# Patient Record
Sex: FEMALE | Race: WHITE | NOT HISPANIC OR LATINO | Employment: OTHER | URBAN - METROPOLITAN AREA
[De-identification: names, ages, dates, MRNs, and addresses within clinical notes are randomized per-mention and may not be internally consistent; named-entity substitution may affect disease eponyms.]

---

## 2017-01-16 ENCOUNTER — PATIENT OUTREACH (OUTPATIENT)
Dept: ADMINISTRATIVE | Facility: HOSPITAL | Age: 67
End: 2017-01-16

## 2017-01-16 NOTE — LETTER
January 20, 2017    Zoraida Barth Matthew  [address] [facility] [address]             [facility]  [address]  [facility] [address]  Phone: [phone] Dear Mrs. Castro:    We have tried to reach you by mychart unsuccessfully.    [facility] is committed to your overall health.  To help you get the most out of each of your visits, we will review your information to make sure you are up to date on all of your recommended tests and/or procedures.       Dr. Mary Turner has found that you may be due for your cholesterol labs, osteoporosis screening, and possibly flu and pneumonia immunizations.     If you have had any of the above done at another facility, please bring the records or information with you so that your record at [facility] will be complete.     If you are currently taking medication, please bring it with you to your appointment for review.     Also, if you have any type of Advanced Directives, please bring them with you to your office visit so we may scan them into your chart.     If you have any questions or concerns, please don't hesitate to call.    Thank you for letting us care for you,  Christina Anderson LPN Clinical Care Coordinator  [facility]  [phone]

## 2017-01-30 ENCOUNTER — OFFICE VISIT (OUTPATIENT)
Dept: FAMILY MEDICINE | Facility: CLINIC | Age: 67
End: 2017-01-30
Payer: COMMERCIAL

## 2017-01-30 VITALS
DIASTOLIC BLOOD PRESSURE: 70 MMHG | TEMPERATURE: 98 F | HEIGHT: 63 IN | WEIGHT: 156.19 LBS | SYSTOLIC BLOOD PRESSURE: 128 MMHG | BODY MASS INDEX: 27.68 KG/M2 | HEART RATE: 68 BPM

## 2017-01-30 DIAGNOSIS — M85.80 AGE-RELATED BONE LOSS: ICD-10-CM

## 2017-01-30 DIAGNOSIS — Z79.899 HIGH RISK MEDICATIONS (NOT ANTICOAGULANTS) LONG-TERM USE: ICD-10-CM

## 2017-01-30 DIAGNOSIS — I10 ESSENTIAL HYPERTENSION: ICD-10-CM

## 2017-01-30 DIAGNOSIS — Z12.31 SCREENING MAMMOGRAM FOR HIGH-RISK PATIENT: ICD-10-CM

## 2017-01-30 DIAGNOSIS — N95.1 MENOPAUSAL SYMPTOMS: ICD-10-CM

## 2017-01-30 DIAGNOSIS — G56.03 BILATERAL CARPAL TUNNEL SYNDROME: ICD-10-CM

## 2017-01-30 DIAGNOSIS — Z00.00 ROUTINE GENERAL MEDICAL EXAMINATION AT A HEALTH CARE FACILITY: Primary | ICD-10-CM

## 2017-01-30 PROCEDURE — 99999 PR PBB SHADOW E&M-EST. PATIENT-LVL III: CPT | Mod: PBBFAC,,, | Performed by: FAMILY MEDICINE

## 2017-01-30 PROCEDURE — 99214 OFFICE O/P EST MOD 30 MIN: CPT | Mod: S$GLB,,, | Performed by: FAMILY MEDICINE

## 2017-01-30 PROCEDURE — 99213 OFFICE O/P EST LOW 20 MIN: CPT | Mod: PBBFAC,PO | Performed by: FAMILY MEDICINE

## 2017-01-30 RX ORDER — MEDROXYPROGESTERONE ACETATE 2.5 MG/1
TABLET ORAL
Qty: 30 TABLET | Refills: 5 | Status: SHIPPED | OUTPATIENT
Start: 2017-01-30 | End: 2017-10-26 | Stop reason: SDUPTHER

## 2017-01-30 RX ORDER — ESTRADIOL 0.5 MG/1
0.5 TABLET ORAL DAILY
Qty: 30 TABLET | Refills: 5 | Status: SHIPPED | OUTPATIENT
Start: 2017-01-30 | End: 2017-10-26 | Stop reason: SDUPTHER

## 2017-01-30 RX ORDER — LISINOPRIL AND HYDROCHLOROTHIAZIDE 12.5; 2 MG/1; MG/1
TABLET ORAL
Qty: 90 TABLET | Refills: 1 | Status: SHIPPED | OUTPATIENT
Start: 2017-01-30 | End: 2017-07-21 | Stop reason: SDUPTHER

## 2017-01-30 NOTE — MR AVS SNAPSHOT
[facility]  [address]  [place] [address]  Phone: [phone]  Fax: [phone]                  Zoraida Castro   2017 8:40 AM   Office Visit    Description:  Female : 1950   Provider:  Mary Turner MD   Department:  [facility]           Reason for Visit     Annual Exam           Diagnoses this Visit        Comments    Routine general medical examination at a health care facility    -  Primary     Essential hypertension         Menopausal symptoms         Age-related bone loss         Screening mammogram for high-risk patient         High risk medications (not anticoagulants) long-term use         Bilateral carpal tunnel syndrome                To Do List           Future Appointments        Provider Department Dept Phone    2017 8:15 AM [facility] MAMMO1 [facility] [phone]    2017 8:30 AM LAB, [place] [facility] [phone]    3/3/2017 7:45 AM EKG, [facility] - Cardiology [phone]      Goals (5 Years of Data)     None      Follow-Up and Disposition     Return in about 1 year (around 2018) for recheck.       These Medications        Disp Refills Start End    lisinopril-hydrochlorothiazide (PRINZIDE,ZESTORETIC) 20-12.5 mg per tablet 90 tablet 1 2017     TAKE 2 TABLETS BY MOUTH EVERY MORNING.    Ph[facility]acy: [id number] [place]  [address] [facility] Ph #: [phone]       medroxyPROGESTERone (PROVERA) 2.5 MG tablet 30 tablet 5 2017     TAKE 1 TABLET (2.5 MG TOTAL)  BY MOUTH ONCE DAILY.    Ph[facility]acy: [id number] - [place] - [address] [facility] Ph #: [phone]       estradiol (ESTRACE) 0.5 MG tablet 30 tablet 5 2017     Take 1 tablet (0.5 mg total) by mouth once daily. - Oral    Pharmacy: [id number] - [place] - [address] [facility] Ph #: [phone]         [facility]     [facility] Nurse Care Line -  "24/7 Assistance  Registered nurses in the [facility] provide clinical advisement, health education, appointment booking, and other advisory services.  Call for this free service at [phone].             Medications           Message regarding Medications     Verify the changes and/or additions to your medication regime listed below are the same as discussed with your clinician today.  If any of these changes or additions are incorrect, please notify your healthcare provider.             Verify that the below list of medications is an accurate representation of the medications you are currently taking.  If none reported, the list may be blank. If incorrect, please contact your healthcare provider. Carry this list with you in case of emergency.           Current Medications     calcium carb&cit-mag12-vit D3 (CALCIUM MAGNESIUM + D) 500-250-200 mg-mg-unit Tab Take by mouth. 1.5 Tablet Oral Twice a day     lisinopril-hydrochlorothiazide (PRINZIDE,ZESTORETIC) 20-12.5 mg per tablet TAKE 2 TABLETS BY MOUTH EVERY MORNING.    medroxyPROGESTERone (PROVERA) 2.5 MG tablet TAKE 1 TABLET (2.5 MG TOTAL)  BY MOUTH ONCE DAILY.    multivitamin with minerals tablet Take 1 tablet by mouth once daily.    estradiol (ESTRACE) 0.5 MG tablet Take 1 tablet (0.5 mg total) by mouth once daily.    scopolamine (TRANSDERM-SCOP) 1.5 mg (1 mg over 3 days) Place 1 patch (1.5 mg total) onto the skin every 72 hours.           Clinical Reference Information           Vital Signs - Last Recorded  Most recent update: 1/30/2017  8:53 AM by Eliana Griffin LPN    BP Pulse Temp Ht Wt BMI    128/70 68 98.2 °F (36.8 °C) 5' 3" (1.6 m) 70.8 kg (156 lb 3.1 oz) 27.67 kg/m2      Blood Pressure          Most Recent Value    BP  128/70      Allergies as of 1/30/2017     Penicillins      Immunizations Administered on Date of Encounter - 1/30/2017     None      Orders Placed During Today's Visit      Normal Orders This Visit    Ambulatory referral to " Orthopedics     IN OFFICE EKG 12-LEAD (to [place])     Future Labs/Procedures Expected by Expires    CBC auto differential  1/30/2017 3/31/2018    Comprehensive metabolic panel  1/30/2017 3/31/2018    DXA Bone Density Spine And Hip  1/30/2017 1/30/2018    Lipid panel  1/30/2017 3/31/2018    Mammo Digital Screening Bilat with CAD  1/30/2017 3/31/2018    TSH  1/30/2017 3/31/2018

## 2017-01-30 NOTE — PROGRESS NOTES
Subjective:       Patient ID: Zoraida Castro is a 67 y.o. female.    Chief Complaint: Annual Exam    HPI   Patient here today for her annual exam.  Still having 4+ hot flashes daily since trying to stop the estrogen supplementation.   Ongoing issues with her hands - some numbness. Spends a lot of time on the computer.  Her dad passed away last fall - doing ok.     Review of Systems   Constitutional: Positive for unexpected weight change (weight gain while her dad was sick last year). Negative for activity change, chills, fatigue and fever.   HENT: Negative for congestion, hearing loss, postnasal drip, rhinorrhea, sinus pressure, sneezing and sore throat.    Eyes: Negative for discharge and redness.   Respiratory: Negative for apnea (none reported), cough (am cough, chronic), shortness of breath and wheezing.    Cardiovascular: Negative for chest pain, palpitations and leg swelling (some fluid retention in the ankles).   Gastrointestinal: Negative for abdominal pain, blood in stool, constipation, diarrhea, nausea and vomiting.        Gerd trigger: bread.   Genitourinary: Negative for difficulty urinating, dysuria, vaginal bleeding and vaginal discharge.   Musculoskeletal: Negative for arthralgias, back pain, gait problem, joint swelling and myalgias.   Skin: Negative for color change and rash.   Neurological: Positive for numbness (L hand occ goes numb while driving, she uses a pillow to rest the arm). Negative for weakness and headaches.   Psychiatric/Behavioral: Negative for dysphoric mood (has used lexapro in the past) and sleep disturbance (doing better of late). The patient is not nervous/anxious.        Objective:      Physical Exam   Constitutional: She is oriented to person, place, and time. She appears well-developed and well-nourished. No distress.   HENT:   Head: Normocephalic and atraumatic.   Right Ear: External ear normal.   Left Ear: External ear normal.   Nose: Nose normal.   Mouth/Throat:  Oropharynx is clear and moist. No oropharyngeal exudate.   Eyes: Conjunctivae and EOM are normal. Pupils are equal, round, and reactive to light.   Neck: Normal range of motion. Neck supple. No thyromegaly present.   Cardiovascular: Normal rate and regular rhythm.    Pulmonary/Chest: Effort normal and breath sounds normal. No respiratory distress. She has no wheezes.   Abdominal: Soft. Bowel sounds are normal. She exhibits no distension and no mass. There is no tenderness. There is no rebound and no guarding.   Musculoskeletal: Normal range of motion. She exhibits no edema.   Lymphadenopathy:     She has no cervical adenopathy.   Neurological: She is alert and oriented to person, place, and time. She has normal reflexes. No cranial nerve deficit.   Skin: Skin is warm and dry.   Psychiatric: She has a normal mood and affect. Her behavior is normal.   Nursing note and vitals reviewed.        Routine general medical examination at a health care facility  -     IN OFFICE EKG 12-LEAD (to [place])  Anticipatory guidance reviewed.  Essential hypertension  -     lisinopril-hydrochlorothiazide (PRINZIDE,ZESTORETIC) 20-12.5 mg per tablet; TAKE 2 TABLETS BY MOUTH EVERY MORNING.  Dispense: 90 tablet; Refill: 1  -     Comprehensive metabolic panel; Future; Expected date: 1/30/17  -     Lipid panel; Future; Expected date: 1/30/17  -     IN OFFICE EKG 12-LEAD (to [place])  Controlled. Currently on 1 tablet daily. Doing well overall.  Menopausal symptoms  -     medroxyPROGESTERone (PROVERA) 2.5 MG tablet; TAKE 1 TABLET (2.5 MG TOTAL)  BY MOUTH ONCE DAILY.  Dispense: 30 tablet; Refill: 5  -     estradiol (ESTRACE) 0.5 MG tablet; Take 1 tablet (0.5 mg total) by mouth once daily.  Dispense: 30 tablet; Refill: 5  -     TSH; Future; Expected date: 1/30/17  Ok to restart estrogen prn for sx control. Pt aware that it may not be covered under ins.  Age-related bone loss  -     DXA Bone Density Spine And Hip; Future; Expected date: 1/30/17  Ca+d,  weight-bearing exercise.  Screening mammogram for high-risk patient  -     Mammo Digital Screening Bilat with CAD; Future; Expected date: 1/30/17  High risk medications (not anticoagulants) long-term use  -     DXA Bone Density Spine And Hip; Future; Expected date: 1/30/17  -     CBC auto differential; Future; Expected date: 1/30/17  -     TSH; Future; Expected date: 1/30/17  Bilateral carpal tunnel syndrome  -     Ambulatory referral to Orthopedics  Mechanics review for modification. F/u with ortho.

## 2017-02-04 ENCOUNTER — HOSPITAL ENCOUNTER (OUTPATIENT)
Dept: RADIOLOGY | Facility: HOSPITAL | Age: 67
Discharge: HOME | End: 2017-02-04
Attending: FAMILY MEDICINE
Payer: MEDICARE

## 2017-02-04 DIAGNOSIS — Z12.31 SCREENING MAMMOGRAM FOR HIGH-RISK PATIENT: ICD-10-CM

## 2017-02-04 PROCEDURE — 77067 SCR MAMMO BI INCL CAD: CPT | Mod: TC

## 2017-02-04 PROCEDURE — 77067 SCR MAMMO BI INCL CAD: CPT | Mod: 26,,, | Performed by: RADIOLOGY

## 2017-02-04 PROCEDURE — 77063 BREAST TOMOSYNTHESIS BI: CPT | Mod: 26,,, | Performed by: RADIOLOGY

## 2017-02-07 ENCOUNTER — TELEPHONE (OUTPATIENT)
Dept: RADIOLOGY | Facility: HOSPITAL | Age: 67
End: 2017-02-07

## 2017-02-20 ENCOUNTER — HOSPITAL ENCOUNTER (OUTPATIENT)
Dept: RADIOLOGY | Facility: HOSPITAL | Age: 67
Discharge: HOME | End: 2017-02-20
Attending: FAMILY MEDICINE
Payer: MEDICARE

## 2017-02-20 DIAGNOSIS — R92.8 ABNORMAL MAMMOGRAM OF RIGHT BREAST: ICD-10-CM

## 2017-02-20 PROCEDURE — 77065 DX MAMMO INCL CAD UNI: CPT | Mod: TC

## 2017-02-20 PROCEDURE — 77061 BREAST TOMOSYNTHESIS UNI: CPT | Mod: 26,,, | Performed by: RADIOLOGY

## 2017-02-20 PROCEDURE — 77061 BREAST TOMOSYNTHESIS UNI: CPT | Mod: TC

## 2017-02-20 PROCEDURE — 77065 DX MAMMO INCL CAD UNI: CPT | Mod: 26,,, | Performed by: RADIOLOGY

## 2017-03-03 ENCOUNTER — APPOINTMENT (OUTPATIENT)
Dept: CARDIOLOGY | Facility: CLINIC | Age: 67
End: 2017-03-03
Payer: MEDICARE

## 2017-03-03 PROCEDURE — 93005 ELECTROCARDIOGRAM TRACING: CPT | Mod: PBBFAC,PO | Performed by: INTERNAL MEDICINE

## 2017-03-03 PROCEDURE — 93000 ELECTROCARDIOGRAM COMPLETE: CPT | Mod: S$GLB,,, | Performed by: INTERNAL MEDICINE

## 2017-05-15 ENCOUNTER — TELEPHONE (OUTPATIENT)
Dept: FAMILY MEDICINE | Facility: CLINIC | Age: 67
End: 2017-05-15

## 2017-05-15 RX ORDER — SCOLOPAMINE TRANSDERMAL SYSTEM 1 MG/1
1 PATCH, EXTENDED RELEASE TRANSDERMAL
Qty: 4 PATCH | Refills: 0 | Status: SHIPPED | OUTPATIENT
Start: 2017-05-15 | End: 2018-02-09 | Stop reason: SDUPTHER

## 2017-05-15 NOTE — TELEPHONE ENCOUNTER
----- Message from Brandee Jones sent at 5/15/2017 11:06 AM CDT -----  Patient is requesting a prescription for a motion prescription patch due [place] ferry [place], contact patient at [phone].        [id number] [place] - [address] [facility]  [address] [facility]  [place] [address]  Phone: [phone] Fax: [phone]

## 2017-07-21 DIAGNOSIS — I10 ESSENTIAL HYPERTENSION: ICD-10-CM

## 2017-07-21 RX ORDER — LISINOPRIL AND HYDROCHLOROTHIAZIDE 12.5; 2 MG/1; MG/1
TABLET ORAL
Qty: 90 TABLET | Refills: 1 | Status: SHIPPED | OUTPATIENT
Start: 2017-07-21 | End: 2019-03-01 | Stop reason: SDUPTHER

## 2017-09-20 ENCOUNTER — TELEPHONE (OUTPATIENT)
Dept: AUDIOLOGY | Facility: CLINIC | Age: 67
End: 2017-09-20

## 2017-09-20 NOTE — TELEPHONE ENCOUNTER
LMOV for pt to call [facility] back regarding hearing aid warranty. Stated that pt's warranty will not be renewed unless she contacts the hearing aid dept. To let us know that she does want to renew the warranty for an additional year.

## 2017-10-26 DIAGNOSIS — N95.1 MENOPAUSAL SYMPTOMS: ICD-10-CM

## 2017-10-26 RX ORDER — ESTRADIOL 0.5 MG/1
0.5 TABLET ORAL DAILY
Qty: 30 TABLET | Refills: 4 | Status: SHIPPED | OUTPATIENT
Start: 2017-10-26 | End: 2018-09-20 | Stop reason: SDUPTHER

## 2017-10-26 RX ORDER — MEDROXYPROGESTERONE ACETATE 2.5 MG/1
TABLET ORAL
Qty: 30 TABLET | Refills: 4 | Status: SHIPPED | OUTPATIENT
Start: 2017-10-26 | End: 2018-09-20 | Stop reason: SDUPTHER

## 2018-01-26 ENCOUNTER — PATIENT OUTREACH (OUTPATIENT)
Dept: ADMINISTRATIVE | Facility: HOSPITAL | Age: 68
End: 2018-01-26

## 2018-01-26 NOTE — LETTER
February 1, 2018    Zoraida Castro  [address] [facility] [address]             [facility]  [address]  [facility] [address]  Phone: [phone] Dear Mrs. Castro:    We have tried to reach you by mychart unsuccessfully.    [facility] committed to your overall health.  To help you get the most out of each of your visits, we will review your information to make sure you are up to date on all of your recommended tests and/or procedures.       Dr. Mary Turner has found that your chart shows you may be due for osteoporosis screening and possibly pneumonia and flu immunizations.     If you have had any of the above done at another facility, please bring the records or information with you so that your record at [facility] will be complete.  If you would like to schedule any of these, please contact me.     If you are currently taking medication, please bring it with you to your appointment for review.     If you have any questions or concerns, please don't hesitate to call.    Thank you for letting us care for you,  Christina Anderson LPN Clinical Care Coordinator  [facility]  [phone]

## 2018-02-09 ENCOUNTER — OFFICE VISIT (OUTPATIENT)
Dept: FAMILY MEDICINE | Facility: CLINIC | Age: 68
End: 2018-02-09
Payer: MEDICARE

## 2018-02-09 VITALS
HEIGHT: 63 IN | BODY MASS INDEX: 27.09 KG/M2 | OXYGEN SATURATION: 97 % | HEART RATE: 73 BPM | SYSTOLIC BLOOD PRESSURE: 132 MMHG | WEIGHT: 152.88 LBS | RESPIRATION RATE: 16 BRPM | TEMPERATURE: 99 F | DIASTOLIC BLOOD PRESSURE: 82 MMHG

## 2018-02-09 DIAGNOSIS — Z78.0 POSTMENOPAUSAL STATE: ICD-10-CM

## 2018-02-09 DIAGNOSIS — Z23 IMMUNIZATION DUE: ICD-10-CM

## 2018-02-09 DIAGNOSIS — N95.1 MENOPAUSAL SYMPTOMS: ICD-10-CM

## 2018-02-09 DIAGNOSIS — I10 HYPERTENSION, UNSPECIFIED TYPE: Primary | ICD-10-CM

## 2018-02-09 DIAGNOSIS — F32.5 MAJOR DEPRESSIVE DISORDER WITH SINGLE EPISODE, IN FULL REMISSION: ICD-10-CM

## 2018-02-09 PROCEDURE — 99214 OFFICE O/P EST MOD 30 MIN: CPT | Mod: PBBFAC,PN | Performed by: FAMILY MEDICINE

## 2018-02-09 PROCEDURE — 99214 OFFICE O/P EST MOD 30 MIN: CPT | Mod: S$PBB,,, | Performed by: FAMILY MEDICINE

## 2018-02-09 PROCEDURE — 99999 PR PBB SHADOW E&M-EST. PATIENT-LVL IV: CPT | Mod: PBBFAC,,, | Performed by: FAMILY MEDICINE

## 2018-02-09 PROCEDURE — G0009 ADMIN PNEUMOCOCCAL VACCINE: HCPCS | Mod: PBBFAC,PN

## 2018-02-09 PROCEDURE — 1159F MED LIST DOCD IN RCRD: CPT | Mod: ,,, | Performed by: FAMILY MEDICINE

## 2018-02-09 PROCEDURE — 1126F AMNT PAIN NOTED NONE PRSNT: CPT | Mod: ,,, | Performed by: FAMILY MEDICINE

## 2018-02-09 RX ORDER — ASPIRIN 81 MG/1
81 TABLET ORAL EVERY OTHER DAY
COMMUNITY

## 2018-02-09 RX ORDER — SCOLOPAMINE TRANSDERMAL SYSTEM 1 MG/1
1 PATCH, EXTENDED RELEASE TRANSDERMAL
Qty: 4 PATCH | Refills: 0 | Status: SHIPPED | OUTPATIENT
Start: 2018-02-09 | End: 2019-02-15 | Stop reason: SDUPTHER

## 2018-02-09 NOTE — PROGRESS NOTES
Subjective:       Patient ID: Zoraida Castro is a 68 y.o. female.    Chief Complaint: Follow-up (check up)    HPI   Patient in the office for f/u and review.  PMH sig for HTN, mdd.  Enjoying assisted, staying busy. Works in the yard a lot.  The 10-year ASCVD risk score (Mary BEDOLLA Jr., et al., 2013) is: 12.2%. (2017).   Recommended she start low-dose asa daily or qod as she reports concern for bruising prev.  Menopausal hot flashes continue if not on HRT. Discussed risks ass'd with continued hrt use, pt expressed understanding.    Review of Systems   Constitutional: Negative for activity change, fatigue and unexpected weight change.   HENT: Negative for congestion, hearing loss, rhinorrhea, sneezing, sore throat and trouble swallowing.    Eyes: Negative for discharge and visual disturbance.   Respiratory: Negative for cough (morning production, clear; resolves by mid-day), chest tightness, shortness of breath and wheezing.    Cardiovascular: Negative for chest pain, palpitations and leg swelling.   Gastrointestinal: Negative for blood in stool, constipation, diarrhea and vomiting.        Occ gerd with trigger foods.   Endocrine: Negative for polydipsia and polyuria.   Genitourinary: Negative for difficulty urinating, dysuria, hematuria and menstrual problem.   Musculoskeletal: Negative for arthralgias, joint swelling and neck pain.        No falls since LOV.   Neurological: Negative for dizziness, weakness, light-headedness and headaches.   Psychiatric/Behavioral: Negative for confusion, dysphoric mood and sleep disturbance.       Objective:      Physical Exam   Constitutional: She is oriented to person, place, and time. She appears well-developed and well-nourished. No distress.   HENT:   Head: Normocephalic and atraumatic.   Right Ear: External ear normal.   Left Ear: External ear normal.   Nose: Nose normal.   Mouth/Throat: Oropharynx is clear and moist. No oropharyngeal exudate.   Eyes: Conjunctivae and EOM  are normal. Pupils are equal, round, and reactive to light.   Neck: Normal range of motion. Neck supple. No thyromegaly present.   Cardiovascular: Normal rate and regular rhythm.    Pulmonary/Chest: Effort normal and breath sounds normal. No respiratory distress. She has no wheezes.   Abdominal: Soft. Bowel sounds are normal. She exhibits no distension and no mass. There is no tenderness. There is no rebound and no guarding.   Musculoskeletal: Normal range of motion. She exhibits no edema.   Lymphadenopathy:     She has no cervical adenopathy.   Neurological: She is alert and oriented to person, place, and time. She has normal reflexes. No cranial nerve deficit.   Skin: Skin is warm and dry.   Psychiatric: She has a normal mood and affect. Her behavior is normal.   Nursing note and vitals reviewed.        Hypertension, unspecified type  -     Comprehensive metabolic panel; Future; Expected date: 02/09/2018  -     TSH; Future; Expected date: 02/09/2018  -     Lipid panel; Future; Expected date: 02/09/2018  Controlled, cont regimen.  Immunization due  -     (In Office Administered) Pneumococcal Conjugate Vaccine (13 Valent) (IM)  Menopausal symptoms  Comments:  decrease dose of HRT by half, if symptoms are manageable, can trial discontinue  Major depressive disorder with single episode, in full remission  Comments:  sx resolved  Postmenopausal state  -     DXA Bone Density Spine And Hip; Future; Expected date: 02/09/2018  Ca+d, weight-bearing exercise.  Other orders  -     scopolamine (TRANSDERM-SCOP) 1.3-1.5 mg (1 mg over 3 days); Place 1 patch onto the skin every 72 hours.  Dispense: 4 patch; Refill: 0 Side effects and precautions of medication use reviewed with patient, expressed understanding. No questions or concerns.

## 2018-02-26 ENCOUNTER — HOSPITAL ENCOUNTER (OUTPATIENT)
Dept: RADIOLOGY | Facility: HOSPITAL | Age: 68
Discharge: HOME | End: 2018-02-26
Attending: FAMILY MEDICINE
Payer: MEDICARE

## 2018-02-26 DIAGNOSIS — Z78.0 POSTMENOPAUSAL STATE: ICD-10-CM

## 2018-02-26 DIAGNOSIS — Z12.31 VISIT FOR SCREENING MAMMOGRAM: ICD-10-CM

## 2018-02-26 PROCEDURE — 77080 DXA BONE DENSITY AXIAL: CPT | Mod: 26,,, | Performed by: RADIOLOGY

## 2018-02-26 PROCEDURE — 77063 BREAST TOMOSYNTHESIS BI: CPT | Mod: 26,,, | Performed by: RADIOLOGY

## 2018-02-26 PROCEDURE — 77067 SCR MAMMO BI INCL CAD: CPT | Mod: 26,,, | Performed by: RADIOLOGY

## 2018-02-26 PROCEDURE — 77080 DXA BONE DENSITY AXIAL: CPT | Mod: TC,PO

## 2018-02-26 PROCEDURE — 77067 SCR MAMMO BI INCL CAD: CPT | Mod: TC,PO

## 2018-03-03 ENCOUNTER — PATIENT MESSAGE (OUTPATIENT)
Dept: FAMILY MEDICINE | Facility: CLINIC | Age: 68
End: 2018-03-03

## 2018-04-16 ENCOUNTER — LAB VISIT (OUTPATIENT)
Dept: LAB | Facility: HOSPITAL | Age: 68
End: 2018-04-16
Attending: FAMILY MEDICINE
Payer: MEDICARE

## 2018-04-16 DIAGNOSIS — I10 HYPERTENSION, UNSPECIFIED TYPE: ICD-10-CM

## 2018-04-16 LAB
ALBUMIN SERPL BCP-MCNC: 3.9 G/DL
ALP SERPL-CCNC: 55 U/L
ALT SERPL W/O P-5'-P-CCNC: 15 U/L
ANION GAP SERPL CALC-SCNC: 8 MMOL/L
AST SERPL-CCNC: 22 U/L
BILIRUB SERPL-MCNC: 0.4 MG/DL
BUN SERPL-MCNC: 16 MG/DL
CALCIUM SERPL-MCNC: 9.7 MG/DL
CHLORIDE SERPL-SCNC: 103 MMOL/L
CHOLEST SERPL-MCNC: 234 MG/DL
CHOLEST/HDLC SERPL: 3 {RATIO}
CO2 SERPL-SCNC: 29 MMOL/L
CREAT SERPL-MCNC: 0.9 MG/DL
EST. GFR  (AFRICAN AMERICAN): >60 ML/MIN/1.73 M^2
EST. GFR  (NON AFRICAN AMERICAN): >60 ML/MIN/1.73 M^2
GLUCOSE SERPL-MCNC: 98 MG/DL
HDLC SERPL-MCNC: 77 MG/DL
HDLC SERPL: 32.9 %
LDLC SERPL CALC-MCNC: 143.4 MG/DL
NONHDLC SERPL-MCNC: 157 MG/DL
POTASSIUM SERPL-SCNC: 3.8 MMOL/L
PROT SERPL-MCNC: 7 G/DL
SODIUM SERPL-SCNC: 140 MMOL/L
TRIGL SERPL-MCNC: 68 MG/DL
TSH SERPL DL<=0.005 MIU/L-ACNC: 1.4 UIU/ML

## 2018-04-16 PROCEDURE — 80053 COMPREHEN METABOLIC PANEL: CPT

## 2018-04-16 PROCEDURE — 80061 LIPID PANEL: CPT

## 2018-04-16 PROCEDURE — 84443 ASSAY THYROID STIM HORMONE: CPT

## 2018-04-16 PROCEDURE — 36415 COLL VENOUS BLD VENIPUNCTURE: CPT | Mod: PN

## 2018-08-06 RX ORDER — LISINOPRIL AND HYDROCHLOROTHIAZIDE 12.5; 2 MG/1; MG/1
TABLET ORAL
Qty: 90 TABLET | Refills: 1 | Status: SHIPPED | OUTPATIENT
Start: 2018-08-06 | End: 2019-03-01 | Stop reason: SDUPTHER

## 2018-09-20 DIAGNOSIS — N95.1 MENOPAUSAL SYMPTOMS: ICD-10-CM

## 2018-09-20 RX ORDER — MEDROXYPROGESTERONE ACETATE 2.5 MG/1
TABLET ORAL
Qty: 30 TABLET | Refills: 4 | Status: SHIPPED | OUTPATIENT
Start: 2018-09-20 | End: 2019-07-24 | Stop reason: SDUPTHER

## 2018-09-20 RX ORDER — ESTRADIOL 0.5 MG/1
TABLET ORAL
Qty: 30 TABLET | Refills: 4 | Status: SHIPPED | OUTPATIENT
Start: 2018-09-20 | End: 2019-07-24 | Stop reason: SDUPTHER

## 2019-02-15 ENCOUNTER — PATIENT MESSAGE (OUTPATIENT)
Dept: FAMILY MEDICINE | Facility: CLINIC | Age: 69
End: 2019-02-15

## 2019-02-15 RX ORDER — SCOLOPAMINE TRANSDERMAL SYSTEM 1 MG/1
1 PATCH, EXTENDED RELEASE TRANSDERMAL
Qty: 10 PATCH | Refills: 1 | Status: SHIPPED | OUTPATIENT
Start: 2019-02-15 | End: 2019-02-21 | Stop reason: SDUPTHER

## 2019-02-19 ENCOUNTER — PATIENT MESSAGE (OUTPATIENT)
Dept: FAMILY MEDICINE | Facility: CLINIC | Age: 69
End: 2019-02-19

## 2019-02-20 ENCOUNTER — PATIENT MESSAGE (OUTPATIENT)
Dept: FAMILY MEDICINE | Facility: CLINIC | Age: 69
End: 2019-02-20

## 2019-02-21 NOTE — TELEPHONE ENCOUNTER
Please see pending rx request for Scopolamine.   Pt sent message to pt advise and states that [facility] has the Scopolamine patch and is requesting two boxes. I contacted [facility] in [place] [address] [address] and they said that they do not have this medication or a current rx for this pt.                  [address] [place], [place] [address]                  [phone]     Last ov 2/9/18

## 2019-02-21 NOTE — TELEPHONE ENCOUNTER
Refill Authorization Note     is requesting a refill authorization.    Brief assessment and rationale for refill: APPROVE: prr  Name and strength of medication: scopolamine (TRANSDERM-SCOP) 1.3-1.5 mg (1 mg over 3 days)       Medication Therapy Plan: last escripted to [facility]. now pt requesting script to be sent to [facility]; Approve 60 day supply as prescribed     Medication reconciliation completed: No              How patient will take medication: utd          Comments:   APPOINTMENTS (past 12 m or future 3m authorizing provider)  LAST VISIT DATE  Mary Turner MD 2/9/2018         NEXT VISIT DATE  Mary Turner MD 5/6/2019

## 2019-02-22 RX ORDER — SCOLOPAMINE TRANSDERMAL SYSTEM 1 MG/1
1 PATCH, EXTENDED RELEASE TRANSDERMAL
Qty: 10 PATCH | Refills: 1 | Status: SHIPPED | OUTPATIENT
Start: 2019-02-22 | End: 2020-03-12 | Stop reason: SDUPTHER

## 2019-02-28 DIAGNOSIS — I10 HYPERTENSION, UNSPECIFIED TYPE: Primary | ICD-10-CM

## 2019-03-01 RX ORDER — LISINOPRIL AND HYDROCHLOROTHIAZIDE 12.5; 2 MG/1; MG/1
TABLET ORAL
Qty: 90 TABLET | Refills: 0 | Status: SHIPPED | OUTPATIENT
Start: 2019-03-01 | End: 2019-07-24 | Stop reason: SDUPTHER

## 2019-03-01 NOTE — PROGRESS NOTES
Refill Authorization Note     is requesting a refill authorization.    Brief assessment and rationale for refill: APPROVE; Needs labs  Name and strength of medication: LISINOPRIL-HYDROCHLOROTHIAZIDE 20-12.5 MG TABLET  Medication-related problems identified: Requires labs    Medication Therapy Plan: HTN -controlled lco (lov 2/18); Needs labs, NTBO (BMP); FOV annual scheduled; approve 3 more    Medication reconciliation completed: No              How patient will take medication: 1 po daily           Comments:   Blood Pressure Readings: <139/89mmHg (12 months)  BP Readings from Last 3 Encounters:   02/09/18 132/82   01/30/17 128/70   02/03/16 136/79        Lab Results   Component Value Date    CREATININE 0.9 04/16/2018    BUN 16 04/16/2018     04/16/2018    K 3.8 04/16/2018     04/16/2018    CO2 29 04/16/2018       APPOINTMENTS (past 12 m or future 3m authorizing provider)  LAST VISIT DATE  Mary Turner MD 2/9/2018         NEXT VISIT DATE  Mary Turner MD 5/6/2019

## 2019-03-01 NOTE — TELEPHONE ENCOUNTER
Tried to reach pt. No answer, left msg to call clinic back in regards to scheduling her for labs. Clinic phone number provided.

## 2019-03-04 ENCOUNTER — PATIENT MESSAGE (OUTPATIENT)
Dept: FAMILY MEDICINE | Facility: CLINIC | Age: 69
End: 2019-03-04

## 2019-03-19 ENCOUNTER — PATIENT MESSAGE (OUTPATIENT)
Dept: FAMILY MEDICINE | Facility: CLINIC | Age: 69
End: 2019-03-19

## 2019-03-19 DIAGNOSIS — Z12.31 ENCOUNTER FOR SCREENING MAMMOGRAM FOR BREAST CANCER: Primary | ICD-10-CM

## 2019-05-01 ENCOUNTER — LAB VISIT (OUTPATIENT)
Dept: LAB | Facility: HOSPITAL | Age: 69
End: 2019-05-01
Attending: FAMILY MEDICINE
Payer: MEDICARE

## 2019-05-01 DIAGNOSIS — I10 HYPERTENSION, UNSPECIFIED TYPE: ICD-10-CM

## 2019-05-01 LAB
ANION GAP SERPL CALC-SCNC: 10 MMOL/L (ref 8–16)
BUN SERPL-MCNC: 12 MG/DL (ref 8–23)
CALCIUM SERPL-MCNC: 9.6 MG/DL (ref 8.7–10.5)
CHLORIDE SERPL-SCNC: 105 MMOL/L (ref 95–110)
CO2 SERPL-SCNC: 25 MMOL/L (ref 23–29)
CREAT SERPL-MCNC: 0.8 MG/DL (ref 0.5–1.4)
EST. GFR  (AFRICAN AMERICAN): >60 ML/MIN/1.73 M^2
EST. GFR  (NON AFRICAN AMERICAN): >60 ML/MIN/1.73 M^2
GLUCOSE SERPL-MCNC: 90 MG/DL (ref 70–110)
POTASSIUM SERPL-SCNC: 4.2 MMOL/L (ref 3.5–5.1)
SODIUM SERPL-SCNC: 140 MMOL/L (ref 136–145)

## 2019-05-01 PROCEDURE — 36415 COLL VENOUS BLD VENIPUNCTURE: CPT | Mod: PN

## 2019-05-01 PROCEDURE — 80048 BASIC METABOLIC PNL TOTAL CA: CPT

## 2019-05-02 ENCOUNTER — HOSPITAL ENCOUNTER (OUTPATIENT)
Dept: RADIOLOGY | Facility: HOSPITAL | Age: 69
Discharge: HOME | End: 2019-05-02
Attending: FAMILY MEDICINE
Payer: MEDICARE

## 2019-05-02 VITALS — BODY MASS INDEX: 26.93 KG/M2 | WEIGHT: 152 LBS | HEIGHT: 63 IN

## 2019-05-02 DIAGNOSIS — Z12.31 ENCOUNTER FOR SCREENING MAMMOGRAM FOR BREAST CANCER: ICD-10-CM

## 2019-05-02 PROCEDURE — 77067 MAMMO DIGITAL SCREENING BILAT WITH TOMOSYNTHESIS_CAD: ICD-10-PCS | Mod: 26,,, | Performed by: RADIOLOGY

## 2019-05-02 PROCEDURE — 77067 SCR MAMMO BI INCL CAD: CPT | Mod: 26,,, | Performed by: RADIOLOGY

## 2019-05-02 PROCEDURE — 77067 SCR MAMMO BI INCL CAD: CPT | Mod: TC,PO

## 2019-05-02 PROCEDURE — 77063 MAMMO DIGITAL SCREENING BILAT WITH TOMOSYNTHESIS_CAD: ICD-10-PCS | Mod: 26,,, | Performed by: RADIOLOGY

## 2019-05-02 PROCEDURE — 77063 BREAST TOMOSYNTHESIS BI: CPT | Mod: 26,,, | Performed by: RADIOLOGY

## 2019-05-03 ENCOUNTER — TELEPHONE (OUTPATIENT)
Dept: RADIOLOGY | Facility: HOSPITAL | Age: 69
End: 2019-05-03

## 2019-05-06 ENCOUNTER — OFFICE VISIT (OUTPATIENT)
Dept: FAMILY MEDICINE | Facility: CLINIC | Age: 69
End: 2019-05-06
Payer: MEDICARE

## 2019-05-06 VITALS
SYSTOLIC BLOOD PRESSURE: 126 MMHG | HEIGHT: 63 IN | TEMPERATURE: 98 F | RESPIRATION RATE: 18 BRPM | BODY MASS INDEX: 24.76 KG/M2 | DIASTOLIC BLOOD PRESSURE: 76 MMHG | HEART RATE: 99 BPM | WEIGHT: 139.75 LBS | OXYGEN SATURATION: 98 %

## 2019-05-06 DIAGNOSIS — F32.5 MAJOR DEPRESSIVE DISORDER WITH SINGLE EPISODE, IN FULL REMISSION: ICD-10-CM

## 2019-05-06 DIAGNOSIS — I10 ESSENTIAL HYPERTENSION: ICD-10-CM

## 2019-05-06 DIAGNOSIS — Z87.898 HISTORY OF ABNORMAL MAMMOGRAM: ICD-10-CM

## 2019-05-06 DIAGNOSIS — Z12.11 COLON CANCER SCREENING: ICD-10-CM

## 2019-05-06 DIAGNOSIS — E78.5 HYPERLIPIDEMIA, UNSPECIFIED HYPERLIPIDEMIA TYPE: ICD-10-CM

## 2019-05-06 DIAGNOSIS — Z00.00 ROUTINE GENERAL MEDICAL EXAMINATION AT A HEALTH CARE FACILITY: Primary | ICD-10-CM

## 2019-05-06 PROCEDURE — 99214 PR OFFICE/OUTPT VISIT, EST, LEVL IV, 30-39 MIN: ICD-10-PCS | Mod: S$PBB,,, | Performed by: FAMILY MEDICINE

## 2019-05-06 PROCEDURE — 99214 OFFICE O/P EST MOD 30 MIN: CPT | Mod: S$PBB,,, | Performed by: FAMILY MEDICINE

## 2019-05-06 PROCEDURE — 99999 PR PBB SHADOW E&M-EST. PATIENT-LVL IV: ICD-10-PCS | Mod: PBBFAC,,, | Performed by: FAMILY MEDICINE

## 2019-05-06 PROCEDURE — 99214 OFFICE O/P EST MOD 30 MIN: CPT | Mod: PBBFAC,PN | Performed by: FAMILY MEDICINE

## 2019-05-06 PROCEDURE — 99999 PR PBB SHADOW E&M-EST. PATIENT-LVL IV: CPT | Mod: PBBFAC,,, | Performed by: FAMILY MEDICINE

## 2019-05-06 NOTE — PROGRESS NOTES
Subjective:       Patient ID: Zoraida Castro is a 69 y.o. female.    Chief Complaint: Annual Exam      Zoraida Castro is in the office for annual exam.    HPI  Reviewed with pt. No updates to medical hx. Recently enjoyed almost a month in [place].   Past Medical History:   Diagnosis Date    Allergy     HBP (high blood pressure)     Headache(784.0)     Hearing loss     Menopausal symptoms      Notes that her mmg is abnormal again.   Discussed consideration re: changing acei at some point given side effect concerns.     Current Outpatient Medications:     aspirin (ECOTRIN) 81 MG EC tablet, Take 81 mg by mouth every other day., Disp: , Rfl:     calcium carb&cit-mag12-vit D3 (CALCIUM MAGNESIUM + D) 500-250-200 mg-mg-unit Tab, Take by mouth. 1.5 Tablet Oral Twice a day , Disp: , Rfl:     estradiol (ESTRACE) 0.5 MG tablet, TAKE ONE TABLET BY MOUTH ONCE DAILY (Patient taking differently: TAKE 0.5 (0.25MG) TABLET BY MOUTH ONCE DAILY), Disp: 30 tablet, Rfl: 4    lisinopril-hydrochlorothiazide (PRINZIDE,ZESTORETIC) 20-12.5 mg per tablet, TAKE ONE TABLET BY MOUTH EVERY MORNING, Disp: 90 tablet, Rfl: 0    medroxyPROGESTERone (PROVERA) 2.5 MG tablet, TAKE ONE TABLET BY MOUTH ONCE DAILY (Patient taking differently: TAKE 0.5 (1.25MG) TABLET BY MOUTH ONCE DAILY), Disp: 30 tablet, Rfl: 4    multivitamin with minerals tablet, Take 1 tablet by mouth once daily., Disp: , Rfl:     scopolamine (TRANSDERM-SCOP) 1.3-1.5 mg (1 mg over 3 days), Place 1 patch onto the skin every 72 hours., Disp: 10 patch, Rfl: 1    The 10-year ASCVD risk score (Mary CHIOMA Jr., et al., 2013) is: 12%    Values used to calculate the score:      Age: 69 years      Sex: Female      Is Non- : No      Diabetic: No      Tobacco smoker: No      Systolic Blood Pressure: 126 mmHg      Is BP treated: Yes      HDL Cholesterol: 77 mg/dL      Total Cholesterol: 234 mg/dL   On asa. Reviewed diet/exercise/statin recs.     No results  found for: HGBA1C  Lab Results   Component Value Date    LDLCALC 143.4 04/16/2018    CREATININE 0.8 05/01/2019   labs 2019 rev.    Review of Systems   Constitutional: Negative for activity change, fatigue and unexpected weight change (working on diet).   HENT: Negative for congestion, hearing loss (has a hearing aid, R), rhinorrhea, sneezing, sore throat and trouble swallowing.    Eyes: Negative for discharge and visual disturbance.        Up to date on exams.   Respiratory: Negative for cough (morning production, clear; resolves by mid-day), shortness of breath and wheezing.    Cardiovascular: Negative for chest pain, palpitations and leg swelling.   Gastrointestinal: Negative for blood in stool, constipation and diarrhea.        Less frequent gerd as she avoids trigger foods.   Endocrine: Negative for polydipsia and polyuria.   Genitourinary: Negative for difficulty urinating, dysuria, hematuria and menstrual problem.   Musculoskeletal: Negative for arthralgias, joint swelling and neck pain.        No falls since LOV.   Skin: Negative for color change and rash.   Neurological: Negative for dizziness, weakness, light-headedness and headaches.   Psychiatric/Behavioral: Negative for dysphoric mood and sleep disturbance (sleeping well).       Objective:      Physical Exam   Constitutional: She is oriented to person, place, and time. She appears well-developed and well-nourished. No distress.   HENT:   Head: Normocephalic and atraumatic.   Right Ear: External ear normal.   Left Ear: External ear normal.   Nose: Nose normal.   Mouth/Throat: Oropharynx is clear and moist. No oropharyngeal exudate.   Eyes: Pupils are equal, round, and reactive to light. Conjunctivae and EOM are normal.   Neck: Normal range of motion. Neck supple. No thyromegaly present.   Cardiovascular: Normal rate and regular rhythm.   Pulmonary/Chest: Effort normal and breath sounds normal. No respiratory distress. She has no wheezes.   Abdominal:  Soft. Bowel sounds are normal. She exhibits no distension and no mass. There is no tenderness. There is no rebound and no guarding.   Musculoskeletal: Normal range of motion. She exhibits no edema.   Lymphadenopathy:     She has no cervical adenopathy.   Neurological: She is alert and oriented to person, place, and time. No cranial nerve deficit.   Skin: Skin is warm and dry.   Psychiatric: She has a normal mood and affect. Her behavior is normal.   Nursing note and vitals reviewed.          Screening recommendations appropriate to age and health status were reviewed.    Assessment & Plan:    Routine general medical examination at a health care facility  Anticipatory guidance reviewed.  Colon cancer screening  -     Fecal Immunochemical Test (iFOBT); Future; Expected date: 05/06/2019  Options in screening for colon cancer were reviewed to include hemoccult cards annually, and colonoscopy.   History of abnormal mammogram  -     Ambulatory referral to General Surgery  Discussed recurrent abn mmg in the same location.   Essential hypertension  -     Lipid panel; Future; Expected date: 05/06/2019  -     CBC auto differential; Future; Expected date: 05/06/2019  -     Comprehensive metabolic panel; Future; Expected date: 05/06/2019  -     TSH; Future; Expected date: 05/06/2019  Controlled, cont regimen.  Hyperlipidemia, unspecified hyperlipidemia type  -     TSH; Future; Expected date: 05/06/2019  Update after diet/exercise. Reviewed ascvd recs. Cont asa.  Major depressive disorder with single episode, in full remission  Stable, on no medications at this time.

## 2019-05-09 ENCOUNTER — HOSPITAL ENCOUNTER (OUTPATIENT)
Dept: RADIOLOGY | Facility: HOSPITAL | Age: 69
Discharge: HOME | End: 2019-05-09
Attending: FAMILY MEDICINE
Payer: MEDICARE

## 2019-05-09 DIAGNOSIS — R92.8 ABNORMAL MAMMOGRAM OF RIGHT BREAST: ICD-10-CM

## 2019-05-09 PROCEDURE — 77065 DX MAMMO INCL CAD UNI: CPT | Mod: 26,,, | Performed by: RADIOLOGY

## 2019-05-09 PROCEDURE — 77061 BREAST TOMOSYNTHESIS UNI: CPT | Mod: 26,,, | Performed by: RADIOLOGY

## 2019-05-09 PROCEDURE — 77065 MAMMO DIGITAL DIAGNOSTIC RIGHT WITH TOMOSYNTHESIS_CAD: ICD-10-PCS | Mod: 26,,, | Performed by: RADIOLOGY

## 2019-05-09 PROCEDURE — 76642 ULTRASOUND BREAST LIMITED: CPT | Mod: 26,RT,, | Performed by: RADIOLOGY

## 2019-05-09 PROCEDURE — 77061 MAMMO DIGITAL DIAGNOSTIC RIGHT WITH TOMOSYNTHESIS_CAD: ICD-10-PCS | Mod: 26,,, | Performed by: RADIOLOGY

## 2019-05-09 PROCEDURE — 77065 DX MAMMO INCL CAD UNI: CPT | Mod: TC,PO

## 2019-05-09 PROCEDURE — 76642 ULTRASOUND BREAST LIMITED: CPT | Mod: TC,PO,RT

## 2019-05-09 PROCEDURE — 76642 US BREAST RIGHT LIMITED: ICD-10-PCS | Mod: 26,RT,, | Performed by: RADIOLOGY

## 2019-05-09 PROCEDURE — 77061 BREAST TOMOSYNTHESIS UNI: CPT | Mod: TC,PO

## 2019-05-20 ENCOUNTER — TELEPHONE (OUTPATIENT)
Dept: FAMILY MEDICINE | Facility: CLINIC | Age: 69
End: 2019-05-20

## 2019-07-19 ENCOUNTER — LAB VISIT (OUTPATIENT)
Dept: LAB | Facility: HOSPITAL | Age: 69
End: 2019-07-19
Attending: FAMILY MEDICINE
Payer: MEDICARE

## 2019-07-19 DIAGNOSIS — E78.5 HYPERLIPIDEMIA, UNSPECIFIED HYPERLIPIDEMIA TYPE: ICD-10-CM

## 2019-07-19 DIAGNOSIS — I10 ESSENTIAL HYPERTENSION: ICD-10-CM

## 2019-07-19 LAB
ALBUMIN SERPL BCP-MCNC: 3.8 G/DL (ref 3.5–5.2)
ALP SERPL-CCNC: 65 U/L (ref 55–135)
ALT SERPL W/O P-5'-P-CCNC: 13 U/L (ref 10–44)
ANION GAP SERPL CALC-SCNC: 11 MMOL/L (ref 8–16)
AST SERPL-CCNC: 24 U/L (ref 10–40)
BASOPHILS # BLD AUTO: 0.05 K/UL (ref 0–0.2)
BASOPHILS NFR BLD: 1 % (ref 0–1.9)
BILIRUB SERPL-MCNC: 0.7 MG/DL (ref 0.1–1)
BUN SERPL-MCNC: 18 MG/DL (ref 8–23)
CALCIUM SERPL-MCNC: 9.4 MG/DL (ref 8.7–10.5)
CHLORIDE SERPL-SCNC: 105 MMOL/L (ref 95–110)
CHOLEST SERPL-MCNC: 257 MG/DL (ref 120–199)
CHOLEST/HDLC SERPL: 3.4 {RATIO} (ref 2–5)
CO2 SERPL-SCNC: 27 MMOL/L (ref 23–29)
CREAT SERPL-MCNC: 0.8 MG/DL (ref 0.5–1.4)
DIFFERENTIAL METHOD: ABNORMAL
EOSINOPHIL # BLD AUTO: 0.2 K/UL (ref 0–0.5)
EOSINOPHIL NFR BLD: 4.2 % (ref 0–8)
ERYTHROCYTE [DISTWIDTH] IN BLOOD BY AUTOMATED COUNT: 12.8 % (ref 11.5–14.5)
EST. GFR  (AFRICAN AMERICAN): >60 ML/MIN/1.73 M^2
EST. GFR  (NON AFRICAN AMERICAN): >60 ML/MIN/1.73 M^2
GLUCOSE SERPL-MCNC: 88 MG/DL (ref 70–110)
HCT VFR BLD AUTO: 42.1 % (ref 37–48.5)
HDLC SERPL-MCNC: 75 MG/DL (ref 40–75)
HDLC SERPL: 29.2 % (ref 20–50)
HGB BLD-MCNC: 13.2 G/DL (ref 12–16)
IMM GRANULOCYTES # BLD AUTO: 0.01 K/UL (ref 0–0.04)
IMM GRANULOCYTES NFR BLD AUTO: 0.2 % (ref 0–0.5)
LDLC SERPL CALC-MCNC: 155.2 MG/DL (ref 63–159)
LYMPHOCYTES # BLD AUTO: 1.9 K/UL (ref 1–4.8)
LYMPHOCYTES NFR BLD: 38.4 % (ref 18–48)
MCH RBC QN AUTO: 29.7 PG (ref 27–31)
MCHC RBC AUTO-ENTMCNC: 31.4 G/DL (ref 32–36)
MCV RBC AUTO: 95 FL (ref 82–98)
MONOCYTES # BLD AUTO: 0.5 K/UL (ref 0.3–1)
MONOCYTES NFR BLD: 9.1 % (ref 4–15)
NEUTROPHILS # BLD AUTO: 2.3 K/UL (ref 1.8–7.7)
NEUTROPHILS NFR BLD: 47.1 % (ref 38–73)
NONHDLC SERPL-MCNC: 182 MG/DL
NRBC BLD-RTO: 0 /100 WBC
PLATELET # BLD AUTO: 301 K/UL (ref 150–350)
PMV BLD AUTO: 10.7 FL (ref 9.2–12.9)
POTASSIUM SERPL-SCNC: 4 MMOL/L (ref 3.5–5.1)
PROT SERPL-MCNC: 6.9 G/DL (ref 6–8.4)
RBC # BLD AUTO: 4.44 M/UL (ref 4–5.4)
SODIUM SERPL-SCNC: 143 MMOL/L (ref 136–145)
TRIGL SERPL-MCNC: 134 MG/DL (ref 30–150)
TSH SERPL DL<=0.005 MIU/L-ACNC: 1.87 UIU/ML (ref 0.4–4)
WBC # BLD AUTO: 4.97 K/UL (ref 3.9–12.7)

## 2019-07-19 PROCEDURE — 84443 ASSAY THYROID STIM HORMONE: CPT

## 2019-07-19 PROCEDURE — 85025 COMPLETE CBC W/AUTO DIFF WBC: CPT

## 2019-07-19 PROCEDURE — 36415 COLL VENOUS BLD VENIPUNCTURE: CPT | Mod: PN

## 2019-07-19 PROCEDURE — 80061 LIPID PANEL: CPT

## 2019-07-19 PROCEDURE — 80053 COMPREHEN METABOLIC PANEL: CPT

## 2019-07-24 DIAGNOSIS — N95.1 MENOPAUSAL SYMPTOMS: ICD-10-CM

## 2019-07-24 DIAGNOSIS — I10 HYPERTENSION, UNSPECIFIED TYPE: ICD-10-CM

## 2019-07-24 RX ORDER — ESTRADIOL 0.5 MG/1
TABLET ORAL
Qty: 30 TABLET | Refills: 1 | Status: SHIPPED | OUTPATIENT
Start: 2019-07-24 | End: 2020-01-23

## 2019-07-24 RX ORDER — LISINOPRIL AND HYDROCHLOROTHIAZIDE 12.5; 2 MG/1; MG/1
TABLET ORAL
Qty: 90 TABLET | Refills: 1 | Status: SHIPPED | OUTPATIENT
Start: 2019-07-24 | End: 2019-12-03 | Stop reason: SDUPTHER

## 2019-07-24 RX ORDER — MEDROXYPROGESTERONE ACETATE 2.5 MG/1
TABLET ORAL
Qty: 30 TABLET | Refills: 1 | Status: SHIPPED | OUTPATIENT
Start: 2019-07-24 | End: 2020-01-23

## 2019-07-25 DIAGNOSIS — E78.5 HYPERLIPIDEMIA, UNSPECIFIED HYPERLIPIDEMIA TYPE: Primary | ICD-10-CM

## 2019-08-08 ENCOUNTER — PATIENT MESSAGE (OUTPATIENT)
Dept: FAMILY MEDICINE | Facility: CLINIC | Age: 69
End: 2019-08-08

## 2019-10-22 ENCOUNTER — PATIENT MESSAGE (OUTPATIENT)
Dept: FAMILY MEDICINE | Facility: CLINIC | Age: 69
End: 2019-10-22

## 2019-10-28 ENCOUNTER — PATIENT OUTREACH (OUTPATIENT)
Dept: ADMINISTRATIVE | Facility: HOSPITAL | Age: 69
End: 2019-10-28

## 2019-11-11 ENCOUNTER — IMMUNIZATION (OUTPATIENT)
Dept: FAMILY MEDICINE | Facility: CLINIC | Age: 69
End: 2019-11-11
Payer: MEDICARE

## 2019-11-11 ENCOUNTER — LAB VISIT (OUTPATIENT)
Dept: LAB | Facility: HOSPITAL | Age: 69
End: 2019-11-11
Attending: FAMILY MEDICINE
Payer: MEDICARE

## 2019-11-11 DIAGNOSIS — E78.5 HYPERLIPIDEMIA, UNSPECIFIED HYPERLIPIDEMIA TYPE: ICD-10-CM

## 2019-11-11 LAB
ALBUMIN SERPL BCP-MCNC: 3.7 G/DL (ref 3.5–5.2)
ALP SERPL-CCNC: 56 U/L (ref 55–135)
ALT SERPL W/O P-5'-P-CCNC: 14 U/L (ref 10–44)
ANION GAP SERPL CALC-SCNC: 7 MMOL/L (ref 8–16)
AST SERPL-CCNC: 23 U/L (ref 10–40)
BILIRUB SERPL-MCNC: 0.6 MG/DL (ref 0.1–1)
BUN SERPL-MCNC: 17 MG/DL (ref 8–23)
CALCIUM SERPL-MCNC: 9 MG/DL (ref 8.7–10.5)
CHLORIDE SERPL-SCNC: 103 MMOL/L (ref 95–110)
CHOLEST SERPL-MCNC: 262 MG/DL (ref 120–199)
CHOLEST/HDLC SERPL: 3.6 {RATIO} (ref 2–5)
CO2 SERPL-SCNC: 28 MMOL/L (ref 23–29)
CREAT SERPL-MCNC: 0.8 MG/DL (ref 0.5–1.4)
EST. GFR  (AFRICAN AMERICAN): >60 ML/MIN/1.73 M^2
EST. GFR  (NON AFRICAN AMERICAN): >60 ML/MIN/1.73 M^2
GLUCOSE SERPL-MCNC: 94 MG/DL (ref 70–110)
HDLC SERPL-MCNC: 73 MG/DL (ref 40–75)
HDLC SERPL: 27.9 % (ref 20–50)
LDLC SERPL CALC-MCNC: 171 MG/DL (ref 63–159)
NONHDLC SERPL-MCNC: 189 MG/DL
POTASSIUM SERPL-SCNC: 3.9 MMOL/L (ref 3.5–5.1)
PROT SERPL-MCNC: 6.8 G/DL (ref 6–8.4)
SODIUM SERPL-SCNC: 138 MMOL/L (ref 136–145)
TRIGL SERPL-MCNC: 90 MG/DL (ref 30–150)

## 2019-11-11 PROCEDURE — 80061 LIPID PANEL: CPT

## 2019-11-11 PROCEDURE — 36415 COLL VENOUS BLD VENIPUNCTURE: CPT | Mod: PN

## 2019-11-11 PROCEDURE — 90662 IIV NO PRSV INCREASED AG IM: CPT | Mod: PBBFAC,PN

## 2019-11-11 PROCEDURE — 80053 COMPREHEN METABOLIC PANEL: CPT

## 2019-12-03 ENCOUNTER — PATIENT MESSAGE (OUTPATIENT)
Dept: FAMILY MEDICINE | Facility: CLINIC | Age: 69
End: 2019-12-03

## 2019-12-03 DIAGNOSIS — I10 HYPERTENSION, UNSPECIFIED TYPE: ICD-10-CM

## 2019-12-03 RX ORDER — BENZONATATE 100 MG/1
100 CAPSULE ORAL 3 TIMES DAILY PRN
Qty: 30 CAPSULE | Refills: 0 | Status: SHIPPED | OUTPATIENT
Start: 2019-12-03 | End: 2019-12-13

## 2019-12-04 RX ORDER — LISINOPRIL AND HYDROCHLOROTHIAZIDE 12.5; 2 MG/1; MG/1
TABLET ORAL
Qty: 90 TABLET | Refills: 1 | Status: SHIPPED | OUTPATIENT
Start: 2019-12-04 | End: 2020-08-20

## 2020-01-22 ENCOUNTER — PATIENT MESSAGE (OUTPATIENT)
Dept: FAMILY MEDICINE | Facility: CLINIC | Age: 70
End: 2020-01-22

## 2020-01-23 DIAGNOSIS — N95.1 MENOPAUSAL SYMPTOMS: ICD-10-CM

## 2020-01-23 RX ORDER — ESTRADIOL 0.5 MG/1
TABLET ORAL
Qty: 30 TABLET | Refills: 1 | Status: SHIPPED | OUTPATIENT
Start: 2020-01-23 | End: 2020-07-17

## 2020-01-23 RX ORDER — MEDROXYPROGESTERONE ACETATE 2.5 MG/1
TABLET ORAL
Qty: 30 TABLET | Refills: 1 | Status: SHIPPED | OUTPATIENT
Start: 2020-01-23 | End: 2020-07-17

## 2020-01-25 ENCOUNTER — PATIENT MESSAGE (OUTPATIENT)
Dept: FAMILY MEDICINE | Facility: CLINIC | Age: 70
End: 2020-01-25

## 2020-01-30 ENCOUNTER — LAB VISIT (OUTPATIENT)
Dept: LAB | Facility: HOSPITAL | Age: 70
End: 2020-01-30
Attending: FAMILY MEDICINE
Payer: MEDICARE

## 2020-01-30 ENCOUNTER — TELEPHONE (OUTPATIENT)
Dept: ORTHOPEDICS | Facility: CLINIC | Age: 70
End: 2020-01-30

## 2020-01-30 ENCOUNTER — OFFICE VISIT (OUTPATIENT)
Dept: FAMILY MEDICINE | Facility: CLINIC | Age: 70
End: 2020-01-30
Payer: MEDICARE

## 2020-01-30 VITALS
SYSTOLIC BLOOD PRESSURE: 120 MMHG | WEIGHT: 152.25 LBS | BODY MASS INDEX: 26.98 KG/M2 | OXYGEN SATURATION: 98 % | DIASTOLIC BLOOD PRESSURE: 78 MMHG | RESPIRATION RATE: 18 BRPM | HEART RATE: 75 BPM | HEIGHT: 63 IN

## 2020-01-30 DIAGNOSIS — R10.32 LLQ PAIN: Primary | ICD-10-CM

## 2020-01-30 DIAGNOSIS — R10.32 LLQ PAIN: ICD-10-CM

## 2020-01-30 DIAGNOSIS — G56.03 BILATERAL CARPAL TUNNEL SYNDROME: ICD-10-CM

## 2020-01-30 LAB
ALBUMIN SERPL BCP-MCNC: 3.5 G/DL (ref 3.5–5.2)
ALP SERPL-CCNC: 85 U/L (ref 55–135)
ALT SERPL W/O P-5'-P-CCNC: 13 U/L (ref 10–44)
ANION GAP SERPL CALC-SCNC: 8 MMOL/L (ref 8–16)
AST SERPL-CCNC: 20 U/L (ref 10–40)
BILIRUB SERPL-MCNC: 0.2 MG/DL (ref 0.1–1)
BUN SERPL-MCNC: 14 MG/DL (ref 8–23)
CALCIUM SERPL-MCNC: 9.5 MG/DL (ref 8.7–10.5)
CHLORIDE SERPL-SCNC: 101 MMOL/L (ref 95–110)
CO2 SERPL-SCNC: 31 MMOL/L (ref 23–29)
CREAT SERPL-MCNC: 0.8 MG/DL (ref 0.5–1.4)
CREAT SERPL-MCNC: 0.8 MG/DL (ref 0.5–1.4)
ERYTHROCYTE [DISTWIDTH] IN BLOOD BY AUTOMATED COUNT: 12.3 % (ref 11.5–14.5)
EST. GFR  (AFRICAN AMERICAN): >60 ML/MIN/1.73 M^2
EST. GFR  (AFRICAN AMERICAN): >60 ML/MIN/1.73 M^2
EST. GFR  (NON AFRICAN AMERICAN): >60 ML/MIN/1.73 M^2
EST. GFR  (NON AFRICAN AMERICAN): >60 ML/MIN/1.73 M^2
GLUCOSE SERPL-MCNC: 88 MG/DL (ref 70–110)
HCT VFR BLD AUTO: 42.2 % (ref 37–48.5)
HGB BLD-MCNC: 12.6 G/DL (ref 12–16)
MCH RBC QN AUTO: 28.8 PG (ref 27–31)
MCHC RBC AUTO-ENTMCNC: 29.9 G/DL (ref 32–36)
MCV RBC AUTO: 97 FL (ref 82–98)
PLATELET # BLD AUTO: 500 K/UL (ref 150–350)
PMV BLD AUTO: 10 FL (ref 9.2–12.9)
POTASSIUM SERPL-SCNC: 4 MMOL/L (ref 3.5–5.1)
PROT SERPL-MCNC: 7.4 G/DL (ref 6–8.4)
RBC # BLD AUTO: 4.37 M/UL (ref 4–5.4)
SODIUM SERPL-SCNC: 140 MMOL/L (ref 136–145)
WBC # BLD AUTO: 5.54 K/UL (ref 3.9–12.7)

## 2020-01-30 PROCEDURE — 99999 PR PBB SHADOW E&M-EST. PATIENT-LVL IV: CPT | Mod: PBBFAC,,, | Performed by: FAMILY MEDICINE

## 2020-01-30 PROCEDURE — 99214 OFFICE O/P EST MOD 30 MIN: CPT | Mod: S$PBB,,, | Performed by: FAMILY MEDICINE

## 2020-01-30 PROCEDURE — 99214 OFFICE O/P EST MOD 30 MIN: CPT | Mod: PBBFAC,PN | Performed by: FAMILY MEDICINE

## 2020-01-30 PROCEDURE — 99999 PR PBB SHADOW E&M-EST. PATIENT-LVL IV: ICD-10-PCS | Mod: PBBFAC,,, | Performed by: FAMILY MEDICINE

## 2020-01-30 PROCEDURE — 85027 COMPLETE CBC AUTOMATED: CPT

## 2020-01-30 PROCEDURE — 36415 COLL VENOUS BLD VENIPUNCTURE: CPT | Mod: PN

## 2020-01-30 PROCEDURE — 80053 COMPREHEN METABOLIC PANEL: CPT

## 2020-01-30 PROCEDURE — 99214 PR OFFICE/OUTPT VISIT, EST, LEVL IV, 30-39 MIN: ICD-10-PCS | Mod: S$PBB,,, | Performed by: FAMILY MEDICINE

## 2020-01-30 NOTE — PROGRESS NOTES
Subjective:       Patient ID: Zoraida Castro is a 70 y.o. female.    Chief Complaint: Abdominal Pain    Abdominal Pain   This is a new problem. The current episode started 1 to 4 weeks ago. The onset quality is sudden. The problem occurs 2 to 4 times per day. The most recent episode lasted 1 hours. The problem has been gradually improving. The pain is located in the suprapubic region. The pain is at a severity of 7/10. The pain is mild. The quality of the pain is cramping. Associated symptoms include belching. Pertinent negatives include no anorexia, arthralgias, constipation, diarrhea, dysuria, fever, flatus, frequency, headaches, hematochezia, hematuria, melena, myalgias, nausea, vomiting or weight loss. The pain is aggravated by certain positions. The pain is relieved by bowel movements. She has tried nothing for the symptoms. There is no history of abdominal surgery, colon cancer, Crohn's disease, gallstones, GERD, irritable bowel syndrome, pancreatitis, PUD or ulcerative colitis. Patient's medical history does not include kidney stones and UTI.     Recalls that 2 weeks ago, she experienced intermittent, but sharp abdominal pains. Prior, she had not been eating well and had not yet gotten back into a good routine. She self-tx on a clear liquid diet and sx improved. +bloating.   She was ok this past week, but noticed that she had some residual cramping before BM.   Afebrile, no f/c. Notes bowels are less volume, but more frequent. Denies diarrhea or constipation. No blood in stools, no black stools. Appetite was down initially, but has since improved. Weight stable, up from LOV. She equates the pain to childbirth.    Review of Systems:  Review of Systems   Constitutional: Negative for fatigue, fever, unexpected weight change and weight loss.   HENT: Negative for congestion, sore throat and trouble swallowing.    Respiratory: Negative for cough and shortness of breath.    Gastrointestinal: Positive for  "abdominal pain. Negative for anorexia, constipation, diarrhea, flatus, hematochezia, melena, nausea and vomiting.   Genitourinary: Negative for difficulty urinating, dysuria, frequency and hematuria.   Musculoskeletal: Negative for arthralgias and myalgias.   Neurological: Negative for dizziness, light-headedness and headaches.       Objective:     Vitals:    01/30/20 0844   BP: 120/78   BP Location: Right arm   Patient Position: Sitting   BP Method: Large (Manual)   Pulse: 75   Resp: 18   SpO2: 98%   Weight: 69 kg (152 lb 3.6 oz)   Height: 5' 3" (1.6 m)          Physical Exam   Constitutional: She is oriented to person, place, and time. She appears well-developed and well-nourished. No distress.   HENT:   Head: Normocephalic and atraumatic.   Eyes: Conjunctivae are normal. Right eye exhibits no discharge. Left eye exhibits no discharge. No scleral icterus.   Neck: Normal range of motion. Neck supple.   Cardiovascular: Normal rate and regular rhythm.   Pulmonary/Chest: Effort normal and breath sounds normal. No respiratory distress.   Abdominal: Soft. She exhibits no distension and no mass. There is no tenderness. There is no rebound and no guarding. No hernia.   Musculoskeletal: Normal range of motion. She exhibits no edema.   Neurological: She is alert and oriented to person, place, and time.   Skin: Skin is warm and dry. No rash noted.   Psychiatric: She has a normal mood and affect. Her behavior is normal.   Nursing note and vitals reviewed.        Assessment & Plan:  LLQ pain  -     CT Abdomen Pelvis With Contrast; Future; Expected date: 01/30/2020  -     Creatinine, serum; Future; Expected date: 01/30/2020  -     CBC Without Differential; Future; Expected date: 01/30/2020  -     Comprehensive metabolic panel; Future; Expected date: 01/30/2020  -     Urinalysis; Future  -     Urine culture; Future  Etiology unclear as sx have since resolved. Pt notes some change to bowel habits during episode, and eating poorly " prior to onset. We discussed diverticulitis as well as AGE, etc. Recommend CT scan for review and discussed cscope this spring as well.   Lab and urine today for review. Encouraged easy diet with increased clear liquids.   Bilateral carpal tunnel syndrome  -     Ambulatory referral/consult to Orthopedics; Future; Expected date: 01/30/2020

## 2020-01-31 DIAGNOSIS — D75.839 THROMBOCYTOSIS: Primary | ICD-10-CM

## 2020-02-05 ENCOUNTER — TELEPHONE (OUTPATIENT)
Dept: FAMILY MEDICINE | Facility: CLINIC | Age: 70
End: 2020-02-05

## 2020-02-05 ENCOUNTER — HOSPITAL ENCOUNTER (OUTPATIENT)
Dept: RADIOLOGY | Facility: HOSPITAL | Age: 70
Discharge: HOME | End: 2020-02-05
Attending: FAMILY MEDICINE
Payer: MEDICARE

## 2020-02-05 DIAGNOSIS — R10.32 LLQ PAIN: ICD-10-CM

## 2020-02-05 PROCEDURE — 74177 CT ABD & PELVIS W/CONTRAST: CPT | Mod: 26,,, | Performed by: RADIOLOGY

## 2020-02-05 PROCEDURE — 74177 CT ABD & PELVIS W/CONTRAST: CPT | Mod: TC,PO

## 2020-02-05 PROCEDURE — 25500020 PHARM REV CODE 255: Mod: PO | Performed by: FAMILY MEDICINE

## 2020-02-05 PROCEDURE — 74177 CT ABDOMEN PELVIS WITH CONTRAST: ICD-10-PCS | Mod: 26,,, | Performed by: RADIOLOGY

## 2020-02-05 RX ORDER — METRONIDAZOLE 500 MG/1
500 TABLET ORAL EVERY 8 HOURS
Qty: 21 TABLET | Refills: 0 | Status: SHIPPED | OUTPATIENT
Start: 2020-02-05 | End: 2020-08-07

## 2020-02-05 RX ADMIN — IOHEXOL 1000 ML: 9 SOLUTION ORAL at 10:02

## 2020-02-05 RX ADMIN — IOHEXOL 75 ML: 350 INJECTION, SOLUTION INTRAVENOUS at 10:02

## 2020-02-05 NOTE — TELEPHONE ENCOUNTER
Ct imaging suspicious for diverticulitis. Recommend she do a course of flagyl to clear.   Cont probiotic. No etoh with abx use.   Let me know if continued sx or concerning change to bowel habits.

## 2020-02-17 ENCOUNTER — PATIENT MESSAGE (OUTPATIENT)
Dept: FAMILY MEDICINE | Facility: CLINIC | Age: 70
End: 2020-02-17

## 2020-03-12 ENCOUNTER — OFFICE VISIT (OUTPATIENT)
Dept: FAMILY MEDICINE | Facility: CLINIC | Age: 70
End: 2020-03-12
Payer: MEDICARE

## 2020-03-12 ENCOUNTER — PATIENT MESSAGE (OUTPATIENT)
Dept: FAMILY MEDICINE | Facility: CLINIC | Age: 70
End: 2020-03-12

## 2020-03-12 VITALS
BODY MASS INDEX: 26.13 KG/M2 | TEMPERATURE: 99 F | OXYGEN SATURATION: 97 % | HEART RATE: 90 BPM | SYSTOLIC BLOOD PRESSURE: 120 MMHG | WEIGHT: 147.5 LBS | DIASTOLIC BLOOD PRESSURE: 78 MMHG

## 2020-03-12 DIAGNOSIS — K57.92 DIVERTICULITIS: Primary | ICD-10-CM

## 2020-03-12 DIAGNOSIS — F32.5 MAJOR DEPRESSIVE DISORDER WITH SINGLE EPISODE, IN FULL REMISSION: ICD-10-CM

## 2020-03-12 DIAGNOSIS — D75.839 THROMBOCYTOSIS: ICD-10-CM

## 2020-03-12 DIAGNOSIS — E78.5 HYPERLIPIDEMIA, UNSPECIFIED HYPERLIPIDEMIA TYPE: ICD-10-CM

## 2020-03-12 DIAGNOSIS — Z71.84 TRAVEL ADVICE ENCOUNTER: ICD-10-CM

## 2020-03-12 PROCEDURE — 99214 PR OFFICE/OUTPT VISIT, EST, LEVL IV, 30-39 MIN: ICD-10-PCS | Mod: S$PBB,,, | Performed by: FAMILY MEDICINE

## 2020-03-12 PROCEDURE — 99999 PR PBB SHADOW E&M-EST. PATIENT-LVL III: CPT | Mod: PBBFAC,,, | Performed by: FAMILY MEDICINE

## 2020-03-12 PROCEDURE — 99213 OFFICE O/P EST LOW 20 MIN: CPT | Mod: PBBFAC,PN | Performed by: FAMILY MEDICINE

## 2020-03-12 PROCEDURE — 99214 OFFICE O/P EST MOD 30 MIN: CPT | Mod: S$PBB,,, | Performed by: FAMILY MEDICINE

## 2020-03-12 PROCEDURE — 99999 PR PBB SHADOW E&M-EST. PATIENT-LVL III: ICD-10-PCS | Mod: PBBFAC,,, | Performed by: FAMILY MEDICINE

## 2020-03-12 RX ORDER — SCOLOPAMINE TRANSDERMAL SYSTEM 1 MG/1
1 PATCH, EXTENDED RELEASE TRANSDERMAL
Qty: 10 PATCH | Refills: 1 | Status: SHIPPED | OUTPATIENT
Start: 2020-03-12 | End: 2022-04-26 | Stop reason: SDUPTHER

## 2020-03-12 NOTE — PROGRESS NOTES
Subjective:       Patient ID: Zoraida Castro is a 70 y.o. female.    Chief Complaint: Follow-up (6 wks)    HPI  Patient in clinic for f/u and review.  We treated for diverticulitis following LLQ pain and +CT with rx flagyl. She notes improvement even before starting abx, but has since normalized. BMs are normal, no blood visible. Pain has resolved.   She is encouraged that her weight is down 5# from LOV.  Labs 2020 rev.  CT abd/pelvis 2020 rev.   Of note, no LBP or lower extremity sx.   Discussed her upcoming travel.     Review of Systems:  Review of Systems   Constitutional: Negative for activity change and unexpected weight change.   HENT: Negative for hearing loss, rhinorrhea and trouble swallowing.    Eyes: Negative for discharge and visual disturbance.   Respiratory: Negative for chest tightness and wheezing.    Cardiovascular: Negative for chest pain and palpitations.   Gastrointestinal: Negative for abdominal pain, anal bleeding, blood in stool, constipation, diarrhea and vomiting.   Endocrine: Negative for polydipsia and polyuria.   Genitourinary: Negative for difficulty urinating, dysuria, hematuria and menstrual problem.   Musculoskeletal: Negative for arthralgias, joint swelling and neck pain.   Neurological: Negative for weakness and headaches.   Psychiatric/Behavioral: Negative for confusion and dysphoric mood.       Objective:     Vitals:    03/12/20 0757   BP: 120/78   BP Location: Right arm   Patient Position: Sitting   BP Method: Medium (Manual)   Pulse: 90   Temp: 98.7 °F (37.1 °C)   TempSrc: Oral   SpO2: 97%   Weight: 66.9 kg (147 lb 7.8 oz)          Physical Exam   Constitutional: She is oriented to person, place, and time. She appears well-developed and well-nourished. No distress.   HENT:   Head: Normocephalic and atraumatic.   Eyes: Conjunctivae are normal. Right eye exhibits no discharge. Left eye exhibits no discharge. No scleral icterus.   Neck: Normal range of motion. Neck supple.    Cardiovascular: Normal rate.   Pulmonary/Chest: Effort normal. No respiratory distress.   Abdominal: Soft. She exhibits no distension.   Musculoskeletal: Normal range of motion. She exhibits no edema.   Neurological: She is alert and oriented to person, place, and time.   Skin: Skin is warm and dry. No rash noted.   Psychiatric: She has a normal mood and affect. Her behavior is normal.   Nursing note and vitals reviewed.        Assessment & Plan:  Diverticulitis  Appears resolved. Patient completed flagyl course.   Thrombocytosis  -     CBC Without Differential; Future; Expected date: 03/12/2020  -     TSH; Future; Expected date: 03/12/2020  -     Basic metabolic panel; Future; Expected date: 03/12/2020  Incidental finding on lab, likely reactive to diverticulitis.   Hyperlipidemia, unspecified hyperlipidemia type  -     TSH; Future; Expected date: 03/12/2020  Update for review.  Travel advice encounter  Reviewed cdc recommendations with re: to travel incl immunization recommendations, travel with medications.    Major depressive disorder with single episode, in full remission  Doing well today.

## 2020-05-06 ENCOUNTER — PATIENT MESSAGE (OUTPATIENT)
Dept: ADMINISTRATIVE | Facility: HOSPITAL | Age: 70
End: 2020-05-06

## 2020-05-12 ENCOUNTER — PATIENT OUTREACH (OUTPATIENT)
Dept: ADMINISTRATIVE | Facility: OTHER | Age: 70
End: 2020-05-12

## 2020-05-12 NOTE — PROGRESS NOTES
Chart reviewed.   Immunizations: Triggered Imm Registry     Orders placed: n/a  Upcoming appts to satisfy LEIDA topics: n/a  Fit kit changed to Colonoscopy

## 2020-05-13 ENCOUNTER — OFFICE VISIT (OUTPATIENT)
Dept: ORTHOPEDICS | Facility: CLINIC | Age: 70
End: 2020-05-13
Payer: MEDICARE

## 2020-05-13 VITALS
DIASTOLIC BLOOD PRESSURE: 83 MMHG | WEIGHT: 147.5 LBS | TEMPERATURE: 98 F | HEIGHT: 63 IN | SYSTOLIC BLOOD PRESSURE: 144 MMHG | BODY MASS INDEX: 26.13 KG/M2 | HEART RATE: 74 BPM

## 2020-05-13 DIAGNOSIS — G56.03 BILATERAL CARPAL TUNNEL SYNDROME: ICD-10-CM

## 2020-05-13 PROCEDURE — 99203 OFFICE O/P NEW LOW 30 MIN: CPT | Mod: S$PBB,,, | Performed by: ORTHOPAEDIC SURGERY

## 2020-05-13 PROCEDURE — 99213 OFFICE O/P EST LOW 20 MIN: CPT | Mod: PBBFAC,PN | Performed by: ORTHOPAEDIC SURGERY

## 2020-05-13 PROCEDURE — 99203 PR OFFICE/OUTPT VISIT, NEW, LEVL III, 30-44 MIN: ICD-10-PCS | Mod: S$PBB,,, | Performed by: ORTHOPAEDIC SURGERY

## 2020-05-13 PROCEDURE — 99999 PR PBB SHADOW E&M-EST. PATIENT-LVL III: ICD-10-PCS | Mod: PBBFAC,,, | Performed by: ORTHOPAEDIC SURGERY

## 2020-05-13 PROCEDURE — 99999 PR PBB SHADOW E&M-EST. PATIENT-LVL III: CPT | Mod: PBBFAC,,, | Performed by: ORTHOPAEDIC SURGERY

## 2020-05-13 NOTE — PROGRESS NOTES
5/13/2020    Chief Complaint:  Chief Complaint   Patient presents with    Right Hand - Numbness    Left Hand - Numbness       HPI:  Zoraida Castro is a 70 y.o. female, who presents to clinic today she has a history of bilateral hand numbness and tingling.  She states that this has been going on for years.  Over the last several months it has gotten significantly worse.  She has tried wearing splints at night which did improve her symptoms moderate amount.  She continues to have numbness and tingling is here today for further evaluation.  She has no other complaints currently.    PMHX:  Past Medical History:   Diagnosis Date    Allergy     HBP (high blood pressure)     Headache(784.0)     Hearing loss     Menopausal symptoms        PSHX:  Past Surgical History:   Procedure Laterality Date    TONSILLECTOMY      VARICOSE VEIN SURGERY         FMHX:  Family History   Problem Relation Age of Onset    Cancer Father         prostate, CLL    Hypertension Father     Stroke Brother     Cancer Mother         lung       SOCHX:  Social History     Tobacco Use    Smoking status: Never Smoker    Smokeless tobacco: Never Used   Substance Use Topics    Alcohol use: Yes     Frequency: 2-4 times a month     Binge frequency: Never       ALLERGIES:  Penicillins    CURRENT MEDICATIONS:  Current Outpatient Medications on File Prior to Visit   Medication Sig Dispense Refill    aspirin (ECOTRIN) 81 MG EC tablet Take 81 mg by mouth every other day.      calcium carb&cit-mag12-vit D3 (CALCIUM MAGNESIUM + D) 500-250-200 mg-mg-unit Tab Take by mouth. 1.5 Tablet Oral Twice a day       estradiol (ESTRACE) 0.5 MG tablet TAKE 1/2 TABLET BY MOUTH ONCE DAILY 30 tablet 1    lisinopril-hydrochlorothiazide (PRINZIDE,ZESTORETIC) 20-12.5 mg per tablet TAKE ONE TABLET BY MOUTH EVERY MORNING 90 tablet 1    medroxyPROGESTERone (PROVERA) 2.5 MG tablet TAKE 1/2 TABLET BY MOUTH ONCE DAILY 30 tablet 1    multivitamin with minerals tablet  "Take 1 tablet by mouth once daily.      metroNIDAZOLE (FLAGYL) 500 MG tablet Take 1 tablet (500 mg total) by mouth every 8 (eight) hours. (Patient not taking: Reported on 5/13/2020) 21 tablet 0    scopolamine (TRANSDERM-SCOP) 1.3-1.5 mg (1 mg over 3 days) Place 1 patch onto the skin every 72 hours. (Patient not taking: Reported on 5/13/2020) 10 patch 1     No current facility-administered medications on file prior to visit.        REVIEW OF SYSTEMS:  Review of Systems   Constitutional: Negative for diaphoresis and fever.   HENT: Negative for ear pain, hearing loss, nosebleeds and tinnitus.    Eyes: Negative for pain and redness.   Respiratory: Negative for cough and shortness of breath.    Cardiovascular: Negative for chest pain and palpitations.   Gastrointestinal: Negative for blood in stool, constipation, diarrhea, nausea and vomiting.   Genitourinary: Negative for dysuria, frequency and hematuria.   Musculoskeletal: Negative for back pain and myalgias.   Skin: Negative for itching and rash.   Neurological: Positive for tingling. Negative for dizziness, seizures, weakness and headaches.   Endo/Heme/Allergies: Negative for environmental allergies.   Psychiatric/Behavioral: The patient is not nervous/anxious.        GENERAL PHYSICAL EXAM:   BP (!) 144/83   Pulse 74   Temp 98.1 °F (36.7 °C) (Temporal)   Ht 5' 3" (1.6 m)   Wt 66.9 kg (147 lb 7.8 oz)   BMI 26.13 kg/m²    GEN: well developed, well nourished, no acute distress   HENT: Normocephalic, atraumatic   EYES: No discharge, conjunctiva normal   NECK: Supple, non-tender   PULM: No wheezing, no respiratory distress   CV: RRR   ABD: Soft, non-tender    ORTHO EXAM:   Examination bilateral hands and wrist reveals there is no edema.  There are no skin changes.  Palpation produces no tenderness.  She does report decreased sensation in the median distribution when compared to the radial and ulnar distributions.  She has negative Tinel's but a positive Durkan's " test bilaterally.  She has bilateral 5/5 thenar muscular strength.  She has 2+ radial pulses bilaterally.    RADIOLOGY:   None    ASSESSMENT:   Bilateral carpal tunnel syndrome    PLAN:  1.  I will send the patient for EMG and nerve conduction study    2.  She will continue to wear the splints at night    3.  Follow up with me after the nerve conduction study for discussion of treatment options which will most likely include decision for steroid injection versus surgical release    Answers for HPI/ROS submitted by the patient on 5/11/2020   Hand injury  unexpected weight change: No  appetite change : No  sleep disturbance: No  IMMUNOCOMPROMISED: No  dysphoric mood: No  visual disturbance: No  sinus pressure : No  food allergies: No  difficulty urinating: No  painful intercourse: No  numbness: No  joint swelling: Yes  Pain Chronicity: chronic  History of trauma: No  Onset: more than 1 year ago  Frequency: constantly  Progression since onset: gradually worsening  injury location: at work  pain- numeric: 5/10  pain location: left hand, right hand  pain quality: numbing, tingling  Radiating Pain: No  Aggravating factors: activity  inability to bear weight: No  joint locking: No  limited range of motion: No  stiffness: Yes  Treatments tried: brace/corset, OTC ointments, OTC pain meds  physical therapy: not tried  Improvement on treatment: no relief

## 2020-05-13 NOTE — LETTER
May 13, 2020      Mary Turner MD  [address]  [facility] [address]           [facility] [place]  [address] [place] [address]  Phone: [phone]          Patient: Zoraida Castro   MR Number: [id number]   YOB: 1950   Date of Visit: 5/13/2020       Dear Dr. Mary Turner:    Thank you for referring Zoraida Castro to me for evaluation. Attached you will find relevant portions of my assessment and plan of care.    If you have questions, please do not hesitate to call me. I look forward to following Zoraida Castro along with you.    Sincerely,    Romeo Estes MD    Enclosure  CC:  No Recipients    If you would like to receive this communication electronically, please contact [email or web address] or [phone] to request more information on [employer] Link access.    For providers and/or their staff who would like to refer a patient to [facility], please contact us through our one-stop-shop provider referral line, [facility], at [phone].    If you feel you have received this communication in error or would no longer like to receive these types of communications, please e-mail [email or web address]

## 2020-05-20 ENCOUNTER — PATIENT OUTREACH (OUTPATIENT)
Dept: ADMINISTRATIVE | Facility: OTHER | Age: 70
End: 2020-05-20

## 2020-05-20 DIAGNOSIS — Z12.11 ENCOUNTER FOR FIT (FECAL IMMUNOCHEMICAL TEST) SCREENING: Primary | ICD-10-CM

## 2020-05-20 NOTE — PROGRESS NOTES
Patient's chart was reviewed.   Requested updates within Care Everywhere.  Immunizations reconciled.    Fit kit ordered  Health Maintenance updated.

## 2020-05-21 ENCOUNTER — OFFICE VISIT (OUTPATIENT)
Dept: PHYSICAL MEDICINE AND REHAB | Facility: CLINIC | Age: 70
End: 2020-05-21
Payer: MEDICARE

## 2020-05-21 DIAGNOSIS — G56.03 BILATERAL CARPAL TUNNEL SYNDROME: ICD-10-CM

## 2020-05-21 PROCEDURE — 95886 PR EMG COMPLETE, W/ NERVE CONDUCTION STUDIES, 5+ MUSCLES: ICD-10-PCS | Mod: 26,S$PBB,, | Performed by: PHYSICAL MEDICINE & REHABILITATION

## 2020-05-21 PROCEDURE — 95886 MUSC TEST DONE W/N TEST COMP: CPT | Mod: 26,S$PBB,, | Performed by: PHYSICAL MEDICINE & REHABILITATION

## 2020-05-21 PROCEDURE — 99999 PR PBB SHADOW E&M-EST. PATIENT-LVL II: ICD-10-PCS | Mod: PBBFAC,,, | Performed by: PHYSICAL MEDICINE & REHABILITATION

## 2020-05-21 PROCEDURE — 99499 UNLISTED E&M SERVICE: CPT | Mod: S$PBB,,, | Performed by: PHYSICAL MEDICINE & REHABILITATION

## 2020-05-21 PROCEDURE — 99212 OFFICE O/P EST SF 10 MIN: CPT | Mod: PBBFAC,PN | Performed by: PHYSICAL MEDICINE & REHABILITATION

## 2020-05-21 PROCEDURE — 99999 PR PBB SHADOW E&M-EST. PATIENT-LVL II: CPT | Mod: PBBFAC,,, | Performed by: PHYSICAL MEDICINE & REHABILITATION

## 2020-05-21 PROCEDURE — 99499 NO LOS: ICD-10-PCS | Mod: S$PBB,,, | Performed by: PHYSICAL MEDICINE & REHABILITATION

## 2020-05-21 PROCEDURE — 95910 NRV CNDJ TEST 7-8 STUDIES: CPT | Mod: 26,S$PBB,, | Performed by: PHYSICAL MEDICINE & REHABILITATION

## 2020-05-21 PROCEDURE — 95910 PR NERVE CONDUCTION STUDY; 7-8 STUDIES: ICD-10-PCS | Mod: 26,S$PBB,, | Performed by: PHYSICAL MEDICINE & REHABILITATION

## 2020-05-21 PROCEDURE — 95910 NRV CNDJ TEST 7-8 STUDIES: CPT | Mod: PBBFAC,PN | Performed by: PHYSICAL MEDICINE & REHABILITATION

## 2020-05-21 PROCEDURE — 95886 MUSC TEST DONE W/N TEST COMP: CPT | Mod: PBBFAC,PN | Performed by: PHYSICAL MEDICINE & REHABILITATION

## 2020-05-21 NOTE — PROGRESS NOTES
[facility]  [address] [facility] [address]  [place] [address]             Full Name: Zoraida Castro Gender: Female  Patient ID: [id number] YOB: 1950  History: BUE n/t mostly in fingers 1 and 2 for many years.  Worse at night and with driving. Denies any pain radiating from neck to her arms. She endorses some dropping of objects and weakness.       Visit Date: 5/21/2020 11:18  Age: 70 Years 4 Months Old  Examining Physician: Dr. Quach  Referring Physician: Dr. Estes  Height: 5 feet 3 inch      Sensory NCS      Nerve / Sites Rec. Site Onset Lat Peak Lat NP Amp PP Amp Segments Distance Velocity     ms ms µV µV  cm m/s   L Median - Digit III (Antidromic)      Wrist Dig III NR NR NR NR Wrist - Dig III 14 NR      Mid palm Dig III 1.35 1.67 3.9 8.4 Mid palm - Dig III 7 52   R Median - Digit III (Antidromic)      Wrist Dig III NR NR NR NR Wrist - Dig III 14 NR      Mid palm Dig III 1.35 1.67 2.3 8.0 Mid palm - Dig III 7 52   L Ulnar - Digit V (Antidromic)      Wrist Dig V 2.24 3.13 38.4 37.2 Wrist - Dig V 14 63   R Ulnar - Digit V (Antidromic)      Wrist Dig V 2.24 3.07 34.8 45.9 Wrist - Dig V 14 63       Motor NCS      Nerve / Sites Muscle Latency Amplitude Amp % Duration Segments Distance Lat Diff Velocity     ms mV % ms  cm ms m/s   R Median - APB      Wrist APB 6.77 3.8 100 7.03 Wrist - APB 8        Elbow APB 10.73 3.6 95.2 7.66 Elbow - Wrist 22 3.96 56   L Median - APB      Wrist APB 3.18 8.0 100 5.36 Wrist - APB 8        Elbow APB 8.07 5.8 72.8 5.99 Elbow - Wrist 22 4.90 45   R Ulnar - ADM      Wrist ADM 2.50 12.9 100 5.63 Wrist - ADM 8        B.Elbow ADM 4.84 12.1 93.5 5.78 B.Elbow - Wrist 14 2.34 60      A.Elbow ADM 6.25 11.6 90.2 5.78 A.Elbow - B.Elbow 10 1.41 71   L Ulnar - ADM      Wrist ADM 2.81 11.6 100 6.25 Wrist - ADM 8        B.Elbow ADM 5.31 10.9 93.8 6.35 B.Elbow - Wrist 15 2.50 60      A.Elbow ADM 6.72 9.7 83.6 6.51 A.Elbow - B.Elbow 10 1.41 71       EMG         EMG Summary Table      Spontaneous MUAP Recruitment   Muscle IA Fib PSW Fasc H.F. Amp Dur. PPP Pattern   L. Deltoid N None None None None N N N N   L. Biceps brachii N None None None None N N N N   L. Triceps brachii N None None None None N N N N   L. Pronator teres N None None None None N N N N   L. First dorsal interosseous N None None None None N N N N   L. Abductor pollicis brevis N None None None None N N N N   R. Abductor pollicis brevis N None None None None N N N N       Summary    The motor conduction test was performed on 4 nerve(s). The results were normal in 2 nerve(s): R Ulnar - ADM, L Ulnar - ADM. Results outside the specified normal range were found in 2 nerve(s), as follows:   In the R Median - APB study  o the take off latency result was increased for Wrist stimulation  o the peak amplitude result was reduced for Wrist stimulation   In the L Median - APB study  o the take off velocity result was reduced for Elbow - Wrist segment    The sensory conduction test was performed on 4 nerve(s). The results were normal in 2 nerve(s): R Ulnar - Digit V (Antidromic), L Ulnar - Digit V (Antidromic). Results outside the specified normal range were found in 2 nerve(s), as follows:   In the L Median - Digit III (Antidromic) study  o the response was considered absent for Wrist stimulation   In the R Median - Digit III (Antidromic) study  o the response was considered absent for Wrist stimulation    The needle EMG study was normal in all 7 tested muscles: L. Deltoid, L. Biceps brachii, L. Triceps brachii, L. Pronator teres, L. First dorsal interosseous, L. Abductor pollicis brevis, R. Abductor pollicis brevis.      Electrodiagnostic Impression:  1. 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  Needle EMG examination of the median nerve innervated abductor pollicis brevis muscle on both sides failed to show signs of active axonal denervation.  2. There was insufficient  electrodiagnostic evidence for diagnoses of peripheral polyneuropathy, myopathy, or active axonal cervical radiculopathy/brachial plexopathy of the left upper extremity.    Clinical Impression:  Severe bilateral carpal tunnel syndrome, worse on the right side.    Plan:  Today's test results will be sent to her referring physician, Dr. Estes, for further review and direction in her treatment.    Thank you very much for the referral. Please call if you have any questions regarding this study or the report.       -------------------------------  Andres Quach M.D.

## 2020-05-21 NOTE — LETTER
May 21, 2020      Romeo Estes MD  [address] [facility] [address] [place] [address]           [facility]  [address] [place] [address]  Phone: [phone]  Fax: [phone]          Patient: Zoraida Castro   MR Number: [id number]   YOB: 1950   Date of Visit: 5/21/2020       Dear Dr. Romeo Estes:    Thank you for referring Zoraida Castro to me for evaluation. Attached you will find relevant portions of my assessment and plan of care.    If you have questions, please do not hesitate to call me. I look forward to following Zoraida Castro along with you.    Sincerely,    Andres Quach MD    Enclosure  CC:  No Recipients    If you would like to receive this communication electronically, please contact [email or web address] or [phone] to request more information on [employer] Link access.    For providers and/or their staff who would like to refer a patient to [facility], please contact us through our one-stop-shop provider referral line, [facility], at [phone].    If you feel you have received this communication in error or would no longer like to receive these types of communications, please e-mail [email or web address]

## 2020-06-16 ENCOUNTER — PATIENT OUTREACH (OUTPATIENT)
Dept: ADMINISTRATIVE | Facility: OTHER | Age: 70
End: 2020-06-16

## 2020-06-17 ENCOUNTER — OFFICE VISIT (OUTPATIENT)
Dept: ORTHOPEDICS | Facility: CLINIC | Age: 70
End: 2020-06-17
Payer: MEDICARE

## 2020-06-17 VITALS
HEIGHT: 63 IN | SYSTOLIC BLOOD PRESSURE: 147 MMHG | DIASTOLIC BLOOD PRESSURE: 80 MMHG | HEART RATE: 75 BPM | WEIGHT: 147.5 LBS | BODY MASS INDEX: 26.13 KG/M2

## 2020-06-17 DIAGNOSIS — Z01.818 PREOP TESTING: Primary | ICD-10-CM

## 2020-06-17 DIAGNOSIS — G56.03 CARPAL TUNNEL SYNDROME, BILATERAL: ICD-10-CM

## 2020-06-17 PROCEDURE — 99999 PR PBB SHADOW E&M-EST. PATIENT-LVL V: CPT | Mod: PBBFAC,,, | Performed by: ORTHOPAEDIC SURGERY

## 2020-06-17 PROCEDURE — 99999 PR PBB SHADOW E&M-EST. PATIENT-LVL V: ICD-10-PCS | Mod: PBBFAC,,, | Performed by: ORTHOPAEDIC SURGERY

## 2020-06-17 PROCEDURE — 99213 OFFICE O/P EST LOW 20 MIN: CPT | Mod: S$PBB,,, | Performed by: ORTHOPAEDIC SURGERY

## 2020-06-17 PROCEDURE — 99213 PR OFFICE/OUTPT VISIT, EST, LEVL III, 20-29 MIN: ICD-10-PCS | Mod: S$PBB,,, | Performed by: ORTHOPAEDIC SURGERY

## 2020-06-17 PROCEDURE — 99215 OFFICE O/P EST HI 40 MIN: CPT | Mod: PBBFAC,PN | Performed by: ORTHOPAEDIC SURGERY

## 2020-06-17 NOTE — PATIENT INSTRUCTIONS
Surgery Instructions:     Your surgery is scheduled on 7/7/2020  at the surgery center: [address], 1st floor, second entrance.    The pre-op department will be in contact with you prior to your procedure to review medications and instructions.       Nothing to eat or drink after midnight prior to day of surgery.    All preoperative testing should be done as soon as possible as it may be needed to obtain clearance.    Your blood work is scheduled for 6/19/2020 at 9:00 AM at the [facility].  You do not need to fast for these labs.    Your pre op COVID test collection is scheduled for 7/4/2020 at 8:30 AM.  Please arrive at the drive thru at entrance 3 for this test collection.  You will not need to get out of your car.    The surgery center will contact you the day prior to surgery to advise you of your arrival time for surgery.     Your post op appointment is scheduled on 7/20/2020 at 9:20 AM.

## 2020-06-18 PROBLEM — Z01.818 PREOP TESTING: Status: ACTIVE | Noted: 2020-06-18

## 2020-06-18 RX ORDER — LIDOCAINE HYDROCHLORIDE 10 MG/ML
1 INJECTION, SOLUTION EPIDURAL; INFILTRATION; INTRACAUDAL; PERINEURAL ONCE
Status: CANCELLED | OUTPATIENT
Start: 2020-06-18 | End: 2020-06-18

## 2020-06-18 NOTE — PROGRESS NOTES
6/18/2020    Chief Complaint:  Chief Complaint   Patient presents with    f/u stefany hand pain/ EMG results       HPI:  Zoraida Castro is a 70 y.o. female, who presents to clinic today she has a history of bilateral carpal tunnel syndrome.  She has been sent for nerve conduction study.  She is here today for follow-up.  She is still complaining of severe symptoms in both the right and left hands.  She does state that the right is worse than left.    PMHX:  Past Medical History:   Diagnosis Date    Allergy     HBP (high blood pressure)     Headache(784.0)     Hearing loss     Menopausal symptoms        PSHX:  Past Surgical History:   Procedure Laterality Date    TONSILLECTOMY      VARICOSE VEIN SURGERY         FMHX:  Family History   Problem Relation Age of Onset    Cancer Father         prostate, CLL    Hypertension Father     Stroke Brother     Cancer Mother         lung       SOCHX:  Social History     Tobacco Use    Smoking status: Never Smoker    Smokeless tobacco: Never Used   Substance Use Topics    Alcohol use: Yes     Frequency: 2-4 times a month     Binge frequency: Never       ALLERGIES:  Penicillins    CURRENT MEDICATIONS:  Current Outpatient Medications on File Prior to Visit   Medication Sig Dispense Refill    aspirin (ECOTRIN) 81 MG EC tablet Take 81 mg by mouth every other day.      calcium carb&cit-mag12-vit D3 (CALCIUM MAGNESIUM + D) 500-250-200 mg-mg-unit Tab Take by mouth. 1.5 Tablet Oral Twice a day       estradiol (ESTRACE) 0.5 MG tablet TAKE 1/2 TABLET BY MOUTH ONCE DAILY 30 tablet 1    lisinopril-hydrochlorothiazide (PRINZIDE,ZESTORETIC) 20-12.5 mg per tablet TAKE ONE TABLET BY MOUTH EVERY MORNING 90 tablet 1    medroxyPROGESTERone (PROVERA) 2.5 MG tablet TAKE 1/2 TABLET BY MOUTH ONCE DAILY 30 tablet 1    metroNIDAZOLE (FLAGYL) 500 MG tablet Take 1 tablet (500 mg total) by mouth every 8 (eight) hours. 21 tablet 0    multivitamin with minerals tablet Take 1 tablet by  "mouth once daily.      scopolamine (TRANSDERM-SCOP) 1.3-1.5 mg (1 mg over 3 days) Place 1 patch onto the skin every 72 hours. 10 patch 1     No current facility-administered medications on file prior to visit.        REVIEW OF SYSTEMS:  MANISHA    GENERAL PHYSICAL EXAM:   BP (!) 147/80   Pulse 75   Ht 5' 3" (1.6 m)   Wt 66.9 kg (147 lb 7.8 oz)   BMI 26.13 kg/m²    GEN: well developed, well nourished, no acute distress   HENT: Normocephalic, atraumatic   EYES: No discharge, conjunctiva normal   NECK: Supple, non-tender   PULM: No wheezing, no respiratory distress   CV: RRR   ABD: Soft, non-tender    ORTHO EXAM:   Examination of bilateral hands reveals that there is no edema.  There are no major skin changes.  Palpation produces no tenderness.  She does report decreased sensation in the median nerve distribution bilaterally.  She has bilateral positive Tinel's and Durkan's test.  She has bilateral 4/5 muscular strength in the thenar eminence.  She does have capillary refill less than 2 sec in all the digits.    EMG/nerve conduction study:   Nerve conduction study has been reviewed.  She is noted to have severe bilateral carpal tunnel syndrome    ASSESSMENT:   Bilateral carpal tunnel syndrome    PLAN:  1.  I have discussed treatment options going forward.  I have discussed the possibility of carpal tunnel release.  After discussion of the risks and benefits of the procedure informed consent has been obtained    2.  Will proceed with right carpal tunnel release under local anesthesia    3.  She will follow up with me 2 weeks postoperatively    "

## 2020-06-19 ENCOUNTER — LAB VISIT (OUTPATIENT)
Dept: LAB | Facility: HOSPITAL | Age: 70
End: 2020-06-19
Attending: ORTHOPAEDIC SURGERY
Payer: MEDICARE

## 2020-06-19 DIAGNOSIS — Z01.818 PREOP TESTING: ICD-10-CM

## 2020-06-19 LAB
ANION GAP SERPL CALC-SCNC: 12 MMOL/L (ref 8–16)
BASOPHILS # BLD AUTO: 0.05 K/UL (ref 0–0.2)
BASOPHILS NFR BLD: 0.9 % (ref 0–1.9)
BUN SERPL-MCNC: 14 MG/DL (ref 8–23)
CALCIUM SERPL-MCNC: 9.3 MG/DL (ref 8.7–10.5)
CHLORIDE SERPL-SCNC: 104 MMOL/L (ref 95–110)
CO2 SERPL-SCNC: 23 MMOL/L (ref 23–29)
CREAT SERPL-MCNC: 0.9 MG/DL (ref 0.5–1.4)
DIFFERENTIAL METHOD: ABNORMAL
EOSINOPHIL # BLD AUTO: 0.2 K/UL (ref 0–0.5)
EOSINOPHIL NFR BLD: 4 % (ref 0–8)
ERYTHROCYTE [DISTWIDTH] IN BLOOD BY AUTOMATED COUNT: 12.7 % (ref 11.5–14.5)
EST. GFR  (AFRICAN AMERICAN): >60 ML/MIN/1.73 M^2
EST. GFR  (NON AFRICAN AMERICAN): >60 ML/MIN/1.73 M^2
GLUCOSE SERPL-MCNC: 94 MG/DL (ref 70–110)
HCT VFR BLD AUTO: 43.4 % (ref 37–48.5)
HGB BLD-MCNC: 13.3 G/DL (ref 12–16)
IMM GRANULOCYTES # BLD AUTO: 0.01 K/UL (ref 0–0.04)
IMM GRANULOCYTES NFR BLD AUTO: 0.2 % (ref 0–0.5)
LYMPHOCYTES # BLD AUTO: 2.2 K/UL (ref 1–4.8)
LYMPHOCYTES NFR BLD: 41.8 % (ref 18–48)
MCH RBC QN AUTO: 29.4 PG (ref 27–31)
MCHC RBC AUTO-ENTMCNC: 30.6 G/DL (ref 32–36)
MCV RBC AUTO: 96 FL (ref 82–98)
MONOCYTES # BLD AUTO: 0.5 K/UL (ref 0.3–1)
MONOCYTES NFR BLD: 9.1 % (ref 4–15)
NEUTROPHILS # BLD AUTO: 2.3 K/UL (ref 1.8–7.7)
NEUTROPHILS NFR BLD: 44 % (ref 38–73)
NRBC BLD-RTO: 0 /100 WBC
PLATELET # BLD AUTO: 290 K/UL (ref 150–350)
PMV BLD AUTO: 11.2 FL (ref 9.2–12.9)
POTASSIUM SERPL-SCNC: 3.9 MMOL/L (ref 3.5–5.1)
RBC # BLD AUTO: 4.53 M/UL (ref 4–5.4)
SODIUM SERPL-SCNC: 139 MMOL/L (ref 136–145)
WBC # BLD AUTO: 5.29 K/UL (ref 3.9–12.7)

## 2020-06-19 PROCEDURE — 80048 BASIC METABOLIC PNL TOTAL CA: CPT

## 2020-06-19 PROCEDURE — 36415 COLL VENOUS BLD VENIPUNCTURE: CPT | Mod: PN

## 2020-06-19 PROCEDURE — 85025 COMPLETE CBC W/AUTO DIFF WBC: CPT

## 2020-07-04 ENCOUNTER — LAB VISIT (OUTPATIENT)
Dept: FAMILY MEDICINE | Facility: CLINIC | Age: 70
End: 2020-07-04
Payer: MEDICARE

## 2020-07-04 DIAGNOSIS — Z01.818 PREOP TESTING: ICD-10-CM

## 2020-07-04 PROCEDURE — U0003 INFECTIOUS AGENT DETECTION BY NUCLEIC ACID (DNA OR RNA); SEVERE ACUTE RESPIRATORY SYNDROME CORONAVIRUS 2 (SARS-COV-2) (CORONAVIRUS DISEASE [COVID-19]), AMPLIFIED PROBE TECHNIQUE, MAKING USE OF HIGH THROUGHPUT TECHNOLOGIES AS DESCRIBED BY CMS-2020-01-R: HCPCS

## 2020-07-05 LAB — SARS-COV-2 RNA RESP QL NAA+PROBE: NOT DETECTED

## 2020-07-06 ENCOUNTER — TELEPHONE (OUTPATIENT)
Dept: SURGERY | Facility: HOSPITAL | Age: 70
End: 2020-07-06

## 2020-07-06 NOTE — TELEPHONE ENCOUNTER
Informed pt Dr. Estes does not require her to hold taking her 81 mg aspirin prior to surgery.  Pt verbalized understanding.

## 2020-07-07 ENCOUNTER — HOSPITAL ENCOUNTER (OUTPATIENT)
Facility: HOSPITAL | Age: 70
Discharge: HOME | End: 2020-07-07
Attending: ORTHOPAEDIC SURGERY | Admitting: ORTHOPAEDIC SURGERY
Payer: MEDICARE

## 2020-07-07 DIAGNOSIS — Z01.818 PREOP TESTING: ICD-10-CM

## 2020-07-07 DIAGNOSIS — G56.03 CARPAL TUNNEL SYNDROME, BILATERAL: ICD-10-CM

## 2020-07-07 PROCEDURE — 71000015 HC POSTOP RECOV 1ST HR: Mod: PO | Performed by: ORTHOPAEDIC SURGERY

## 2020-07-07 PROCEDURE — 25000003 PHARM REV CODE 250: Mod: PO | Performed by: ORTHOPAEDIC SURGERY

## 2020-07-07 PROCEDURE — 36000706: Mod: PO | Performed by: ORTHOPAEDIC SURGERY

## 2020-07-07 PROCEDURE — 64721 PR REVISE MEDIAN N/CARPAL TUNNEL SURG: ICD-10-PCS | Mod: RT,,, | Performed by: ORTHOPAEDIC SURGERY

## 2020-07-07 PROCEDURE — 36000707: Mod: PO | Performed by: ORTHOPAEDIC SURGERY

## 2020-07-07 PROCEDURE — 64721 CARPAL TUNNEL SURGERY: CPT | Mod: RT,,, | Performed by: ORTHOPAEDIC SURGERY

## 2020-07-07 PROCEDURE — 63600175 PHARM REV CODE 636 W HCPCS: Mod: PO | Performed by: ORTHOPAEDIC SURGERY

## 2020-07-07 RX ORDER — CLINDAMYCIN PHOSPHATE 600 MG/50ML
600 INJECTION, SOLUTION INTRAVENOUS
Status: COMPLETED | OUTPATIENT
Start: 2020-07-07 | End: 2020-07-07

## 2020-07-07 RX ORDER — SODIUM CHLORIDE, SODIUM LACTATE, POTASSIUM CHLORIDE, CALCIUM CHLORIDE 600; 310; 30; 20 MG/100ML; MG/100ML; MG/100ML; MG/100ML
INJECTION, SOLUTION INTRAVENOUS CONTINUOUS
Status: DISCONTINUED | OUTPATIENT
Start: 2020-07-07 | End: 2020-07-07 | Stop reason: HOSPADM

## 2020-07-07 RX ORDER — DIPHENHYDRAMINE HYDROCHLORIDE 50 MG/ML
25 INJECTION INTRAMUSCULAR; INTRAVENOUS EVERY 6 HOURS PRN
Status: DISCONTINUED | OUTPATIENT
Start: 2020-07-07 | End: 2020-07-07 | Stop reason: HOSPADM

## 2020-07-07 RX ORDER — OXYCODONE HYDROCHLORIDE 5 MG/1
5 TABLET ORAL
Status: DISCONTINUED | OUTPATIENT
Start: 2020-07-07 | End: 2020-07-07 | Stop reason: HOSPADM

## 2020-07-07 RX ORDER — MEPERIDINE HYDROCHLORIDE 50 MG/ML
12.5 INJECTION INTRAMUSCULAR; INTRAVENOUS; SUBCUTANEOUS ONCE AS NEEDED
Status: DISCONTINUED | OUTPATIENT
Start: 2020-07-07 | End: 2020-07-07 | Stop reason: HOSPADM

## 2020-07-07 RX ORDER — BUPIVACAINE HYDROCHLORIDE 2.5 MG/ML
INJECTION, SOLUTION EPIDURAL; INFILTRATION; INTRACAUDAL
Status: DISCONTINUED | OUTPATIENT
Start: 2020-07-07 | End: 2020-07-07 | Stop reason: HOSPADM

## 2020-07-07 RX ORDER — FENTANYL CITRATE 50 UG/ML
25 INJECTION, SOLUTION INTRAMUSCULAR; INTRAVENOUS EVERY 5 MIN PRN
Status: DISCONTINUED | OUTPATIENT
Start: 2020-07-07 | End: 2020-07-07 | Stop reason: HOSPADM

## 2020-07-07 RX ORDER — ONDANSETRON 2 MG/ML
4 INJECTION INTRAMUSCULAR; INTRAVENOUS DAILY PRN
Status: DISCONTINUED | OUTPATIENT
Start: 2020-07-07 | End: 2020-07-07 | Stop reason: HOSPADM

## 2020-07-07 RX ORDER — IBUPROFEN 600 MG/1
600 TABLET ORAL EVERY 6 HOURS PRN
Qty: 6 TABLET | Refills: 0 | Status: SHIPPED | OUTPATIENT
Start: 2020-07-07 | End: 2022-06-20

## 2020-07-07 RX ORDER — LIDOCAINE HYDROCHLORIDE 10 MG/ML
INJECTION, SOLUTION EPIDURAL; INFILTRATION; INTRACAUDAL; PERINEURAL
Status: DISCONTINUED | OUTPATIENT
Start: 2020-07-07 | End: 2020-07-07 | Stop reason: HOSPADM

## 2020-07-07 RX ORDER — HYDROMORPHONE HYDROCHLORIDE 2 MG/ML
0.2 INJECTION, SOLUTION INTRAMUSCULAR; INTRAVENOUS; SUBCUTANEOUS EVERY 5 MIN PRN
Status: DISCONTINUED | OUTPATIENT
Start: 2020-07-07 | End: 2020-07-07 | Stop reason: HOSPADM

## 2020-07-07 RX ORDER — LIDOCAINE HYDROCHLORIDE 10 MG/ML
1 INJECTION, SOLUTION EPIDURAL; INFILTRATION; INTRACAUDAL; PERINEURAL ONCE
Status: DISCONTINUED | OUTPATIENT
Start: 2020-07-07 | End: 2020-07-07 | Stop reason: HOSPADM

## 2020-07-07 RX ADMIN — CLINDAMYCIN IN 5 PERCENT DEXTROSE 600 MG: 12 INJECTION, SOLUTION INTRAVENOUS at 07:07

## 2020-07-07 RX ADMIN — SODIUM CHLORIDE, SODIUM LACTATE, POTASSIUM CHLORIDE, AND CALCIUM CHLORIDE: .6; .31; .03; .02 INJECTION, SOLUTION INTRAVENOUS at 07:07

## 2020-07-07 NOTE — OP NOTE
Zoraida Castro  1950    DATE OF SURGERY: 7/7/2020     PRE-OPERATIVE DIAGNOSIS: right Carpal Tunnel Syndrome    POST-OPERATIVE DIAGNOSIS: right Carpal Tunnel Syndrome     ANESTHESIA TYPE: Local    BLOOD LOSS: Less than 10 cc    TOURNIQUET TIME:  Approximately 9 min    SURGEON: Dr. Estes    ASSISTANT: Denia De La Fuente    PROCEDURE: right Carpal Tunnel Release    IMPLANTS: None    SPECIMENS: None    INDICATION:     Ms. Castro presented to my clinic with a history of right carpal tunnel symptoms. Conservative treatments were initially tried. The patient did have relief with attempts at conservative treatment however has had a return of symptoms. An EMG and nerve conduction study has been performed. That study has shown compression of the median nerve at the wrist consistent with carpal tunnel syndrome. A discussion of the risks and benefits of carpal tunnel release has been performed, and informed consent for the procedure has been obtained.    PROCEDURE IN DETAIL:     Ms. Castro was transported to the operating room and was placed supine on the operating room table. All appropriate points were padded. The right hand and arm was prepped and draped in the normal sterile fashion. Time out was called. The correct patient, correct operative site, correct procedure, antibiotic administration which consisted of  2 g of Ancef, and allergies to medications which are to Penicillins  were reviewed. Time in was then called.     Attention was turned to right hand where a 3 cm incision was made in the palm of the hand. This was carried through the skin and subcutaneous tissues were dissected bluntly. The superficial palmar fascia was identified and was split in line with its fibers. The transverse carpal ligament was then identified and was incised for a distance of approximately 1 cm sharply.The median nerve was visualized and a carpal tunnel sled was placed below the transverse carpal ligament but above the median  nerve. Blunt dissection proximally over the transverse carpal ligament was performed and elevator was placed just above the transverse carpal ligament proximally. The ligament was identified. There were noted to be no penetrating nerve branches and at that point the carpal tunnel knife was used to incise the proximal transverse carpal ligament. Attention was then turned to the distal portion of the transverse carpal ligament. The carpal tunnel sled was again placed underneath the transverse carpal ligament but above the median nerve distally. Blunt dissection above the transverse carpal ligament distally was performed and an elevator was placed. The distal portion of the transverse carpal ligament was visualized and there were noted to be no penetrating branches of the nerve. At that point, tenotomy scissors were used to transect the distal portion of the transverse carpal ligament under direct visualization. The median nerve was then visualized. There were noted to be no specific lesions of the nerve and all of its branches were noted to be intact. The wound was then irrigated copiously. The tourniquet was let down and meticulous hemostasis was obtained with bipolar cautery. The wound was closed with 4-0 nylon suture superficially. The wound was then dressed with Xeroform, 4 x 4's, cast padding and a 3 inch Ace wrap was placed.      The patient was stable in the operating room and was transported to the recovery room in stable condition. All lap, needle, sponge, and equipment counts were correct at the end of the case.    POST-OPERATIVE PLAN:     The patient will keep a soft dressing in place for two weeks at which time she will followup with me. The dressing will be taken down, and the sutures will be removed at that time. She is not to get the dressing wet or to take it off. She will have a 2 pound weight limit of the left upper extremity for a total of 4 weeks.

## 2020-07-07 NOTE — DISCHARGE SUMMARY
[facility]  Discharge Note  Short Stay    Procedure(s) (LRB):  RELEASE, CARPAL TUNNEL (Right)    OUTCOME: Patient tolerated treatment/procedure well without complication and is now ready for discharge.    DISPOSITION: Home or Self Care    FINAL DIAGNOSIS:  Carpal tunnel syndrome, bilateral    FOLLOWUP: In clinic    DISCHARGE INSTRUCTIONS:    Discharge Procedure Orders   Diet general     Activity as tolerated     Keep surgical extremity elevated     Lifting restrictions     Call MD for:  temperature >100.4     Call MD for:  persistent nausea and vomiting     Call MD for:  severe uncontrolled pain     Call MD for:  difficulty breathing, headache or visual disturbances     Call MD for:  redness, tenderness, or signs of infection (pain, swelling, redness, odor or green/yellow discharge around incision site)     Call MD for:  hives     Call MD for:  persistent dizziness or light-headedness     Call MD for:  extreme fatigue     Leave dressing on - Keep it clean, dry, and intact until clinic visit

## 2020-07-07 NOTE — DISCHARGE INSTRUCTIONS
Procedure: right carpal tunnel release    1. Please keep the dressing clean, dry, and in place. Do not take it off and do not get it wet.    2. Please keep the right arm and hand elevated for the 1st 24-48 hours to prevent swelling    3. Flexion and extension of the exposed fingers is encouraged, but do not attempt to push off or lift more than 1-2 pounds with right arm or hand    4. Please limit weightbearing to the right hand to 1-2 pounds. Light activity such as brushing your teeth, using a fork, or lifting a small drink is allowed starting today.    5. Pain medication has been prescribed. Please take it as necessary    6. If there are any questions or concerns please call Dr. Estes's office at [phone]    7.  Follow up with Dr. Estes in 2 weeks

## 2020-07-08 VITALS
RESPIRATION RATE: 18 BRPM | BODY MASS INDEX: 26.05 KG/M2 | DIASTOLIC BLOOD PRESSURE: 88 MMHG | HEART RATE: 68 BPM | TEMPERATURE: 98 F | HEIGHT: 63 IN | WEIGHT: 147 LBS | SYSTOLIC BLOOD PRESSURE: 140 MMHG | OXYGEN SATURATION: 99 %

## 2020-07-20 ENCOUNTER — OFFICE VISIT (OUTPATIENT)
Dept: ORTHOPEDICS | Facility: CLINIC | Age: 70
End: 2020-07-20
Payer: MEDICARE

## 2020-07-20 VITALS
HEIGHT: 62 IN | DIASTOLIC BLOOD PRESSURE: 76 MMHG | TEMPERATURE: 99 F | BODY MASS INDEX: 27.06 KG/M2 | HEART RATE: 92 BPM | SYSTOLIC BLOOD PRESSURE: 138 MMHG | WEIGHT: 147.06 LBS

## 2020-07-20 DIAGNOSIS — G56.03 CARPAL TUNNEL SYNDROME, BILATERAL: Primary | ICD-10-CM

## 2020-07-20 PROCEDURE — 99024 PR POST-OP FOLLOW-UP VISIT: ICD-10-PCS | Mod: POP,,, | Performed by: ORTHOPAEDIC SURGERY

## 2020-07-20 PROCEDURE — 99999 PR PBB SHADOW E&M-EST. PATIENT-LVL III: CPT | Mod: PBBFAC,,, | Performed by: ORTHOPAEDIC SURGERY

## 2020-07-20 PROCEDURE — 99999 PR PBB SHADOW E&M-EST. PATIENT-LVL III: ICD-10-PCS | Mod: PBBFAC,,, | Performed by: ORTHOPAEDIC SURGERY

## 2020-07-20 PROCEDURE — 99213 OFFICE O/P EST LOW 20 MIN: CPT | Mod: PBBFAC,PN | Performed by: ORTHOPAEDIC SURGERY

## 2020-07-20 PROCEDURE — 99024 POSTOP FOLLOW-UP VISIT: CPT | Mod: POP,,, | Performed by: ORTHOPAEDIC SURGERY

## 2020-07-20 NOTE — PROGRESS NOTES
Zoraida Castro is a 70 y.o. female who is approximately 2 weeks status post right carpal tunnel release. She is doing very well. She states that the majority of carpal tunnel symptoms are improved or resolved.    Physical exam: Examination of the right hand reveals that the incision is healing well. There is no significant edema,  erythema or drainage. Sensation is grossly intact median radial and ulnar distributions. Motor function of the thenar musculature is intact. Capillary refill less than 2 seconds in all of the digits. The Patient is able to make a full composite fist and fully extend the fingers without significant pain.    Assessment: Status post right carpal tunnel release    Plan:    1. Sutures were removed today and Steri-Strips are placed    2. Can begin hand washing in running water tomorrow    3. Continue a 2-3 pound weight limit to the arm and hand    4. Follow up with me in 2 weeks for repeat evaluation at that time if she is doing really well we may consider scheduling her left carpal tunnel release

## 2020-07-22 ENCOUNTER — PATIENT OUTREACH (OUTPATIENT)
Dept: ADMINISTRATIVE | Facility: HOSPITAL | Age: 70
End: 2020-07-22

## 2020-07-22 NOTE — PROGRESS NOTES
Health Maintenance Due   Topic Date Due    Colorectal Cancer Screening  05/10/2019       Chart review completed 2020.  Care Everywhere updates requested and reviewed.  Immunizations reconciled. Media reports reviewed.  Duplicate HM overrides and  orders removed.  Overdue HM topic chart audit and/or requested.  Overdue lab testing linked to upcoming lab appointments if applies.

## 2020-07-29 ENCOUNTER — LAB VISIT (OUTPATIENT)
Dept: LAB | Facility: HOSPITAL | Age: 70
End: 2020-07-29
Attending: FAMILY MEDICINE
Payer: MEDICARE

## 2020-07-29 DIAGNOSIS — D75.839 THROMBOCYTOSIS: ICD-10-CM

## 2020-07-29 DIAGNOSIS — E78.5 HYPERLIPIDEMIA, UNSPECIFIED HYPERLIPIDEMIA TYPE: ICD-10-CM

## 2020-07-29 LAB
ANION GAP SERPL CALC-SCNC: 9 MMOL/L (ref 8–16)
BASOPHILS # BLD AUTO: 0.05 K/UL (ref 0–0.2)
BASOPHILS NFR BLD: 1 % (ref 0–1.9)
BUN SERPL-MCNC: 16 MG/DL (ref 8–23)
CALCIUM SERPL-MCNC: 8.9 MG/DL (ref 8.7–10.5)
CHLORIDE SERPL-SCNC: 107 MMOL/L (ref 95–110)
CO2 SERPL-SCNC: 26 MMOL/L (ref 23–29)
CREAT SERPL-MCNC: 0.8 MG/DL (ref 0.5–1.4)
DIFFERENTIAL METHOD: ABNORMAL
EOSINOPHIL # BLD AUTO: 0.3 K/UL (ref 0–0.5)
EOSINOPHIL NFR BLD: 5.4 % (ref 0–8)
ERYTHROCYTE [DISTWIDTH] IN BLOOD BY AUTOMATED COUNT: 12.5 % (ref 11.5–14.5)
ERYTHROCYTE [DISTWIDTH] IN BLOOD BY AUTOMATED COUNT: 12.5 % (ref 11.5–14.5)
EST. GFR  (AFRICAN AMERICAN): >60 ML/MIN/1.73 M^2
EST. GFR  (NON AFRICAN AMERICAN): >60 ML/MIN/1.73 M^2
GLUCOSE SERPL-MCNC: 90 MG/DL (ref 70–110)
HCT VFR BLD AUTO: 42.3 % (ref 37–48.5)
HCT VFR BLD AUTO: 42.3 % (ref 37–48.5)
HGB BLD-MCNC: 13.2 G/DL (ref 12–16)
HGB BLD-MCNC: 13.2 G/DL (ref 12–16)
IMM GRANULOCYTES # BLD AUTO: 0 K/UL (ref 0–0.04)
IMM GRANULOCYTES NFR BLD AUTO: 0 % (ref 0–0.5)
LYMPHOCYTES # BLD AUTO: 1.9 K/UL (ref 1–4.8)
LYMPHOCYTES NFR BLD: 39.3 % (ref 18–48)
MCH RBC QN AUTO: 29.7 PG (ref 27–31)
MCH RBC QN AUTO: 29.7 PG (ref 27–31)
MCHC RBC AUTO-ENTMCNC: 31.2 G/DL (ref 32–36)
MCHC RBC AUTO-ENTMCNC: 31.2 G/DL (ref 32–36)
MCV RBC AUTO: 95 FL (ref 82–98)
MCV RBC AUTO: 95 FL (ref 82–98)
MONOCYTES # BLD AUTO: 0.4 K/UL (ref 0.3–1)
MONOCYTES NFR BLD: 7.7 % (ref 4–15)
NEUTROPHILS # BLD AUTO: 2.2 K/UL (ref 1.8–7.7)
NEUTROPHILS NFR BLD: 46.6 % (ref 38–73)
NRBC BLD-RTO: 0 /100 WBC
PLATELET # BLD AUTO: 321 K/UL (ref 150–350)
PLATELET # BLD AUTO: 321 K/UL (ref 150–350)
PMV BLD AUTO: 10.8 FL (ref 9.2–12.9)
PMV BLD AUTO: 10.8 FL (ref 9.2–12.9)
POTASSIUM SERPL-SCNC: 3.9 MMOL/L (ref 3.5–5.1)
RBC # BLD AUTO: 4.45 M/UL (ref 4–5.4)
RBC # BLD AUTO: 4.45 M/UL (ref 4–5.4)
SODIUM SERPL-SCNC: 142 MMOL/L (ref 136–145)
TSH SERPL DL<=0.005 MIU/L-ACNC: 1.34 UIU/ML (ref 0.4–4)
WBC # BLD AUTO: 4.78 K/UL (ref 3.9–12.7)
WBC # BLD AUTO: 4.78 K/UL (ref 3.9–12.7)

## 2020-07-29 PROCEDURE — 36415 COLL VENOUS BLD VENIPUNCTURE: CPT | Mod: PN

## 2020-07-29 PROCEDURE — 84443 ASSAY THYROID STIM HORMONE: CPT

## 2020-07-29 PROCEDURE — 85025 COMPLETE CBC W/AUTO DIFF WBC: CPT

## 2020-07-29 PROCEDURE — 80048 BASIC METABOLIC PNL TOTAL CA: CPT

## 2020-08-07 ENCOUNTER — OFFICE VISIT (OUTPATIENT)
Dept: FAMILY MEDICINE | Facility: CLINIC | Age: 70
End: 2020-08-07
Payer: MEDICARE

## 2020-08-07 VITALS
BODY MASS INDEX: 27.22 KG/M2 | OXYGEN SATURATION: 97 % | TEMPERATURE: 99 F | DIASTOLIC BLOOD PRESSURE: 72 MMHG | HEART RATE: 90 BPM | SYSTOLIC BLOOD PRESSURE: 114 MMHG | WEIGHT: 148.81 LBS

## 2020-08-07 DIAGNOSIS — I10 ESSENTIAL HYPERTENSION: ICD-10-CM

## 2020-08-07 DIAGNOSIS — Z12.11 SPECIAL SCREENING FOR MALIGNANT NEOPLASMS, COLON: Primary | ICD-10-CM

## 2020-08-07 DIAGNOSIS — E78.5 HYPERLIPIDEMIA, UNSPECIFIED HYPERLIPIDEMIA TYPE: ICD-10-CM

## 2020-08-07 DIAGNOSIS — Z12.31 SCREENING MAMMOGRAM FOR HIGH-RISK PATIENT: ICD-10-CM

## 2020-08-07 DIAGNOSIS — E04.1 THYROID NODULE: ICD-10-CM

## 2020-08-07 DIAGNOSIS — Z00.00 ROUTINE GENERAL MEDICAL EXAMINATION AT A HEALTH CARE FACILITY: ICD-10-CM

## 2020-08-07 PROCEDURE — 99214 OFFICE O/P EST MOD 30 MIN: CPT | Mod: S$PBB,,, | Performed by: FAMILY MEDICINE

## 2020-08-07 PROCEDURE — 99214 OFFICE O/P EST MOD 30 MIN: CPT | Mod: PBBFAC,PN | Performed by: FAMILY MEDICINE

## 2020-08-07 PROCEDURE — 99214 PR OFFICE/OUTPT VISIT, EST, LEVL IV, 30-39 MIN: ICD-10-PCS | Mod: S$PBB,,, | Performed by: FAMILY MEDICINE

## 2020-08-07 PROCEDURE — 99999 PR PBB SHADOW E&M-EST. PATIENT-LVL IV: CPT | Mod: PBBFAC,,, | Performed by: FAMILY MEDICINE

## 2020-08-07 PROCEDURE — 81001 URINALYSIS AUTO W/SCOPE: CPT

## 2020-08-07 PROCEDURE — 99999 PR PBB SHADOW E&M-EST. PATIENT-LVL IV: ICD-10-PCS | Mod: PBBFAC,,, | Performed by: FAMILY MEDICINE

## 2020-08-07 NOTE — PROGRESS NOTES
Subjective:       Patient ID: Zoraida Castro is a 70 y.o. female.    Chief Complaint: Annual Exam      Zoraida Castro is in the office for annual exam.    HPI  Since LOV, has had R carpal tunnel release with ortho/dudoussat. Doing well, and notes definite improvement since procedure. She has f/u to schedule her L hand.  Past Medical History:   Diagnosis Date    Allergy     HBP (high blood pressure)     Headache(784.0)     Hearing loss     Menopausal symptoms     PONV (postoperative nausea and vomiting)          Current Outpatient Medications:     aspirin (ECOTRIN) 81 MG EC tablet, Take 81 mg by mouth every other day., Disp: , Rfl:     calcium carb&cit-mag12-vit D3 (CALCIUM MAGNESIUM + D) 500-250-200 mg-mg-unit Tab, Take by mouth. 1.5 Tablet Oral Twice a day , Disp: , Rfl:     estradioL (ESTRACE) 0.5 MG tablet, TAKE 1/2 TABLET BY MOUTH ONCE DAILY, Disp: 30 tablet, Rfl: 1    ibuprofen (ADVIL,MOTRIN) 600 MG tablet, Take 1 tablet (600 mg total) by mouth every 6 (six) hours as needed for Pain., Disp: 6 tablet, Rfl: 0    lisinopril-hydrochlorothiazide (PRINZIDE,ZESTORETIC) 20-12.5 mg per tablet, TAKE ONE TABLET BY MOUTH EVERY MORNING, Disp: 90 tablet, Rfl: 1    multivitamin with minerals tablet, Take 1 tablet by mouth once daily., Disp: , Rfl:     scopolamine (TRANSDERM-SCOP) 1.3-1.5 mg (1 mg over 3 days), Place 1 patch onto the skin every 72 hours., Disp: 10 patch, Rfl: 1    The 10-year ASCVD risk score (Mary CHIOMA Jr., et al., 2013) is: 11.5%    Values used to calculate the score:      Age: 70 years      Sex: Female      Is Non- : No      Diabetic: No      Tobacco smoker: No      Systolic Blood Pressure: 114 mmHg      Is BP treated: Yes      HDL Cholesterol: 73 mg/dL      Total Cholesterol: 262 mg/dL     No results found for: HGBA1C  Lab Results   Component Value Date    LDLCALC 171.0 (H) 11/11/2019    CREATININE 0.8 07/29/2020   labs 2019, 2020 rv.    Review of Systems    Constitutional: Negative for activity change, fatigue and unexpected weight change.   HENT: Negative for hearing loss, rhinorrhea and trouble swallowing.    Eyes: Negative for discharge and visual disturbance.   Respiratory: Negative for cough (occ am clearing), chest tightness and wheezing.    Cardiovascular: Negative for chest pain, palpitations and leg swelling.   Gastrointestinal: Negative for abdominal pain, anal bleeding, blood in stool, constipation, diarrhea and nausea.        No gerd c/o, avoiding trigger foods   Endocrine: Negative for polydipsia and polyuria.   Genitourinary: Negative for difficulty urinating, dysuria, frequency (nighttime x 2) and hematuria.   Musculoskeletal: Negative for arthralgias, joint swelling and neck pain.        R hand is improved, pending L.   Skin: Negative for color change, rash and wound (R CTS repair c/d/i, well healed).   Neurological: Negative for dizziness, weakness, light-headedness, numbness and headaches.   Psychiatric/Behavioral: Negative for confusion, dysphoric mood and sleep disturbance.         Objective:      Physical Exam  Vitals signs and nursing note reviewed.   Constitutional:       General: She is not in acute distress.     Appearance: She is well-developed.   HENT:      Head: Normocephalic and atraumatic.      Right Ear: Tympanic membrane normal.      Left Ear: Tympanic membrane normal.   Eyes:      General: No scleral icterus.        Right eye: No discharge.         Left eye: No discharge.      Conjunctiva/sclera: Conjunctivae normal.   Neck:      Musculoskeletal: Normal range of motion and neck supple.   Cardiovascular:      Rate and Rhythm: Normal rate.   Pulmonary:      Effort: Pulmonary effort is normal. No respiratory distress.   Abdominal:      General: There is no distension.      Palpations: Abdomen is soft.   Musculoskeletal: Normal range of motion.      Left shoulder: She exhibits deformity (2cm cyst, mobile, nontender ).   Skin:     General:  Skin is warm and dry.      Findings: No rash.   Neurological:      General: No focal deficit present.      Mental Status: She is alert and oriented to person, place, and time.   Psychiatric:         Mood and Affect: Mood normal.         Behavior: Behavior normal.             Screening recommendations appropriate to age and health status were reviewed.    Assessment & Plan:    Special screening for malignant neoplasms, colon  -     Fecal Immunochemical Test (iFOBT); Future; Expected date: 08/07/2020    Essential hypertension  Comments:  controlled  Orders:  -     Comprehensive metabolic panel; Future; Expected date: 08/07/2020  -     Lipid Panel; Future; Expected date: 08/07/2020  -     Urinalysis; Future    Hyperlipidemia, unspecified hyperlipidemia type  Comments:  work on diet and repeat in 3mos for monitoring, consider statin  Orders:  -     Comprehensive metabolic panel; Future; Expected date: 08/07/2020  -     Lipid Panel; Future; Expected date: 08/07/2020  -     Urinalysis; Future    Routine general medical examination at a health care facility  Comments:  anticipatory guidance reviewed    Screening mammogram for high-risk patient  -     Mammo Digital Screening Bilateral With CAD; Future; Expected date: 08/07/2020    Thyroid nodule  Comments:  will update us at convenience for prev nodule, of note bx was benign  Orders:  -     US Soft Tissue Head Neck Thyroid; Future; Expected date: 08/07/2020

## 2020-08-08 LAB
AMORPH CRY UR QL COMP ASSIST: ABNORMAL
BACTERIA #/AREA URNS AUTO: ABNORMAL /HPF
BILIRUB UR QL STRIP: NEGATIVE
CLARITY UR REFRACT.AUTO: ABNORMAL
COLOR UR AUTO: YELLOW
GLUCOSE UR QL STRIP: NEGATIVE
HGB UR QL STRIP: NEGATIVE
KETONES UR QL STRIP: NEGATIVE
LEUKOCYTE ESTERASE UR QL STRIP: NEGATIVE
MICROSCOPIC COMMENT: ABNORMAL
NITRITE UR QL STRIP: NEGATIVE
NON-SQ EPI CELLS #/AREA URNS AUTO: 1 /HPF
PH UR STRIP: 6 [PH] (ref 5–8)
PROT UR QL STRIP: NEGATIVE
RBC #/AREA URNS AUTO: 2 /HPF (ref 0–4)
SP GR UR STRIP: 1.02 (ref 1–1.03)
SQUAMOUS #/AREA URNS AUTO: 3 /HPF
URN SPEC COLLECT METH UR: ABNORMAL
WBC #/AREA URNS AUTO: 2 /HPF (ref 0–5)

## 2020-08-10 ENCOUNTER — OFFICE VISIT (OUTPATIENT)
Dept: ORTHOPEDICS | Facility: CLINIC | Age: 70
End: 2020-08-10
Payer: MEDICARE

## 2020-08-10 VITALS
WEIGHT: 148.81 LBS | SYSTOLIC BLOOD PRESSURE: 146 MMHG | HEIGHT: 62 IN | DIASTOLIC BLOOD PRESSURE: 91 MMHG | BODY MASS INDEX: 27.38 KG/M2 | HEART RATE: 79 BPM

## 2020-08-10 DIAGNOSIS — Z98.890 STATUS POST CARPAL TUNNEL RELEASE: Primary | ICD-10-CM

## 2020-08-10 PROCEDURE — 99024 POSTOP FOLLOW-UP VISIT: CPT | Mod: POP,,, | Performed by: ORTHOPAEDIC SURGERY

## 2020-08-10 PROCEDURE — 99213 OFFICE O/P EST LOW 20 MIN: CPT | Mod: PBBFAC,PN | Performed by: ORTHOPAEDIC SURGERY

## 2020-08-10 PROCEDURE — 99999 PR PBB SHADOW E&M-EST. PATIENT-LVL III: CPT | Mod: PBBFAC,,, | Performed by: ORTHOPAEDIC SURGERY

## 2020-08-10 PROCEDURE — 99999 PR PBB SHADOW E&M-EST. PATIENT-LVL III: ICD-10-PCS | Mod: PBBFAC,,, | Performed by: ORTHOPAEDIC SURGERY

## 2020-08-10 PROCEDURE — 99024 PR POST-OP FOLLOW-UP VISIT: ICD-10-PCS | Mod: POP,,, | Performed by: ORTHOPAEDIC SURGERY

## 2020-08-11 NOTE — PROGRESS NOTES
Ms Castro returns to clinic today.  She is status post right carpal tunnel release.  She is approximately 4-5 weeks postop.  States that she has had improvement in her symptoms and she is overall doing well    Physical exam:  Examination the right hand reveals the incision is healing well.  There is minimal to no erythema or edema.  Palpation produces no specific tenderness.  She is able make a full composite fist and fully extend the fingers.  She does report grossly intact sensation in the median radial and ulnar distribution    Assessment:  Status post right carpal tunnel release    Plan:    1.  Patient states that she would like to hold off on doing her left hand until she has a more convenient time in the near future.    2.  Follow up with me on a p.r.n. basis if she would like to consider having treatment on her left carpal tunnel

## 2020-08-12 ENCOUNTER — LAB VISIT (OUTPATIENT)
Dept: LAB | Facility: HOSPITAL | Age: 70
End: 2020-08-12
Attending: FAMILY MEDICINE
Payer: MEDICARE

## 2020-08-12 DIAGNOSIS — Z12.11 SPECIAL SCREENING FOR MALIGNANT NEOPLASMS, COLON: ICD-10-CM

## 2020-08-12 PROCEDURE — 82274 ASSAY TEST FOR BLOOD FECAL: CPT

## 2020-08-18 LAB — HEMOCCULT STL QL IA: NEGATIVE

## 2020-09-17 ENCOUNTER — PATIENT OUTREACH (OUTPATIENT)
Dept: ADMINISTRATIVE | Facility: OTHER | Age: 70
End: 2020-09-17

## 2020-09-17 NOTE — PROGRESS NOTES
LINKS immunization registry not responding  Care Everywhere updated  Health Maintenance updated  Chart reviewed for overdue Proactive [facility] ([facility]) health maintenance testing (CRS, Breast Ca, Diabetic Eye Exam)   Orders entered:N/A

## 2020-09-21 ENCOUNTER — OFFICE VISIT (OUTPATIENT)
Dept: ORTHOPEDICS | Facility: CLINIC | Age: 70
End: 2020-09-21
Payer: MEDICARE

## 2020-09-21 VITALS
SYSTOLIC BLOOD PRESSURE: 151 MMHG | HEART RATE: 80 BPM | WEIGHT: 148.81 LBS | HEIGHT: 62 IN | DIASTOLIC BLOOD PRESSURE: 78 MMHG | BODY MASS INDEX: 27.38 KG/M2

## 2020-09-21 DIAGNOSIS — G56.02 LEFT CARPAL TUNNEL SYNDROME: ICD-10-CM

## 2020-09-21 DIAGNOSIS — Z01.818 PREOP TESTING: Primary | ICD-10-CM

## 2020-09-21 PROCEDURE — 99999 PR PBB SHADOW E&M-EST. PATIENT-LVL V: CPT | Mod: PBBFAC,,, | Performed by: ORTHOPAEDIC SURGERY

## 2020-09-21 PROCEDURE — 99999 PR PBB SHADOW E&M-EST. PATIENT-LVL V: ICD-10-PCS | Mod: PBBFAC,,, | Performed by: ORTHOPAEDIC SURGERY

## 2020-09-21 PROCEDURE — 99213 OFFICE O/P EST LOW 20 MIN: CPT | Mod: 24,S$PBB,, | Performed by: ORTHOPAEDIC SURGERY

## 2020-09-21 PROCEDURE — 99215 OFFICE O/P EST HI 40 MIN: CPT | Mod: PBBFAC,PN | Performed by: ORTHOPAEDIC SURGERY

## 2020-09-21 PROCEDURE — 99213 PR OFFICE/OUTPT VISIT, EST, LEVL III, 20-29 MIN: ICD-10-PCS | Mod: 24,S$PBB,, | Performed by: ORTHOPAEDIC SURGERY

## 2020-09-21 RX ORDER — MEDROXYPROGESTERONE ACETATE 2.5 MG/1
2.5 TABLET ORAL DAILY
COMMUNITY
End: 2020-10-07

## 2020-09-21 NOTE — H&P (VIEW-ONLY)
9/21/2020    Chief Complaint:  Chief Complaint   Patient presents with    schedule left carpal tunnel release       HPI:  Zoraida Castro is a 70 y.o. female, who presents to clinic today has a history of bilateral carpal tunnel syndrome.  She underwent a right carpal tunnel release and has done very well.  She continues to have numbness and tingling to her left hand and is considering having left carpal tunnel released as well she has no other complaints    PMHX:  Past Medical History:   Diagnosis Date    Allergy     HBP (high blood pressure)     Headache(784.0)     Hearing loss     Menopausal symptoms     PONV (postoperative nausea and vomiting)        PSHX:  Past Surgical History:   Procedure Laterality Date    CARPAL TUNNEL RELEASE Right 7/7/2020    Procedure: RELEASE, CARPAL TUNNEL;  Surgeon: Romeo Estes MD;  Location: [facility];  Service: Orthopedics;  Laterality: Right;  Procedure:  Right carpal tunnel release    Position:  Supine    Anesthesia:  Local    Equipment:  Carpal tunnel set    TONSILLECTOMY      VARICOSE VEIN SURGERY         FMHX:  Family History   Problem Relation Age of Onset    Cancer Father         prostate, CLL    Hypertension Father     Stroke Brother     Cancer Mother         lung    Arthritis Mother        SOCHX:  Social History     Tobacco Use    Smoking status: Never Smoker    Smokeless tobacco: Never Used   Substance Use Topics    Alcohol use: Yes     Alcohol/week: 2.0 standard drinks     Types: 2 Glasses of wine per week     Frequency: 2-4 times a month     Binge frequency: Never     Comment: occ       ALLERGIES:  Penicillins    CURRENT MEDICATIONS:  Current Outpatient Medications on File Prior to Visit   Medication Sig Dispense Refill    aspirin (ECOTRIN) 81 MG EC tablet Take 81 mg by mouth every other day.      calcium carb&cit-mag12-vit D3 (CALCIUM MAGNESIUM + D) 500-250-200 mg-mg-unit Tab Take by mouth. 1.5 Tablet Oral Twice a day       estradioL  "(ESTRACE) 0.5 MG tablet TAKE 1/2 TABLET BY MOUTH ONCE DAILY 30 tablet 1    lisinopriL-hydrochlorothiazide (PRINZIDE,ZESTORETIC) 20-12.5 mg per tablet TAKE ONE TABLET BY MOUTH EVERY MORNING 90 tablet 1    medroxyPROGESTERone (PROVERA) 2.5 MG tablet Take 2.5 mg by mouth once daily. 1/2 tab once daily      multivitamin with minerals tablet Take 1 tablet by mouth once daily.      ibuprofen (ADVIL,MOTRIN) 600 MG tablet Take 1 tablet (600 mg total) by mouth every 6 (six) hours as needed for Pain. (Patient not taking: Reported on 9/21/2020) 6 tablet 0    scopolamine (TRANSDERM-SCOP) 1.3-1.5 mg (1 mg over 3 days) Place 1 patch onto the skin every 72 hours. (Patient not taking: Reported on 9/21/2020) 10 patch 1     No current facility-administered medications on file prior to visit.        REVIEW OF SYSTEMS:  Review of Systems   Constitutional: Negative.    HENT: Negative for hearing loss, nosebleeds and sore throat.    Respiratory: Negative for shortness of breath and wheezing.    Cardiovascular: Negative for chest pain and palpitations.   Gastrointestinal: Negative for heartburn, nausea and vomiting.   Genitourinary: Negative for dysuria and urgency.   Skin: Negative.    Neurological: Negative for seizures, loss of consciousness and weakness.       GENERAL PHYSICAL EXAM:   BP (!) 151/78 Comment: provider notified  Pulse 80   Ht 5' 2" (1.575 m)   Wt 67.5 kg (148 lb 13 oz)   BMI 27.22 kg/m²    GEN: well developed, well nourished, no acute distress   HENT: Normocephalic, atraumatic   EYES: No discharge, conjunctiva normal   NECK: Supple, non-tender   PULM: No wheezing, no respiratory distress   CV: RRR   ABD: Soft, non-tender    ORTHO EXAM:   Examination of the left hand and wrist reveals that there is no edema.  There are no skin changes.  Palpation produces no tenderness.  She reports grossly intact sensation in the median radial ulnar distribution.  She has 5/5 muscular strength of the thenar eminence.  She has a " negative Tinel's but a positive Durkan's test.  She has a 2+ radial pulse    RADIOLOGY:   Nerve conduction study has been reviewed.  She is noted have moderate to severe left carpal tunnel syndrome    ASSESSMENT:   Left carpal tunnel syndrome    PLAN:  1.  I have discussed treatment options with the patient.  I have discussed the risks and benefits of carpal tunnel release.  The patient has provided informed consent    2.  Will proceed with left carpal tunnel release under local anesthesia    3.  Follow up with me 2 weeks postoperatively

## 2020-09-21 NOTE — PROGRESS NOTES
9/21/2020    Chief Complaint:  Chief Complaint   Patient presents with    schedule left carpal tunnel release       HPI:  Zoraida Castro is a 70 y.o. female, who presents to clinic today has a history of bilateral carpal tunnel syndrome.  She underwent a right carpal tunnel release and has done very well.  She continues to have numbness and tingling to her left hand and is considering having left carpal tunnel released as well she has no other complaints    PMHX:  Past Medical History:   Diagnosis Date    Allergy     HBP (high blood pressure)     Headache(784.0)     Hearing loss     Menopausal symptoms     PONV (postoperative nausea and vomiting)        PSHX:  Past Surgical History:   Procedure Laterality Date    CARPAL TUNNEL RELEASE Right 7/7/2020    Procedure: RELEASE, CARPAL TUNNEL;  Surgeon: Romeo Estes MD;  Location: [facility] OR;  Service: Orthopedics;  Laterality: Right;  Procedure:  Right carpal tunnel release    Position:  Supine    Anesthesia:  Local    Equipment:  Carpal tunnel set    TONSILLECTOMY      VARICOSE VEIN SURGERY         FMHX:  Family History   Problem Relation Age of Onset    Cancer Father         prostate, CLL    Hypertension Father     Stroke Brother     Cancer Mother         lung    Arthritis Mother        SOCHX:  Social History     Tobacco Use    Smoking status: Never Smoker    Smokeless tobacco: Never Used   Substance Use Topics    Alcohol use: Yes     Alcohol/week: 2.0 standard drinks     Types: 2 Glasses of wine per week     Frequency: 2-4 times a month     Binge frequency: Never     Comment: occ       ALLERGIES:  Penicillins    CURRENT MEDICATIONS:  Current Outpatient Medications on File Prior to Visit   Medication Sig Dispense Refill    aspirin (ECOTRIN) 81 MG EC tablet Take 81 mg by mouth every other day.      calcium carb&cit-mag12-vit D3 (CALCIUM MAGNESIUM + D) 500-250-200 mg-mg-unit Tab Take by mouth. 1.5 Tablet Oral Twice a day       estradioL  "(ESTRACE) 0.5 MG tablet TAKE 1/2 TABLET BY MOUTH ONCE DAILY 30 tablet 1    lisinopriL-hydrochlorothiazide (PRINZIDE,ZESTORETIC) 20-12.5 mg per tablet TAKE ONE TABLET BY MOUTH EVERY MORNING 90 tablet 1    medroxyPROGESTERone (PROVERA) 2.5 MG tablet Take 2.5 mg by mouth once daily. 1/2 tab once daily      multivitamin with minerals tablet Take 1 tablet by mouth once daily.      ibuprofen (ADVIL,MOTRIN) 600 MG tablet Take 1 tablet (600 mg total) by mouth every 6 (six) hours as needed for Pain. (Patient not taking: Reported on 9/21/2020) 6 tablet 0    scopolamine (TRANSDERM-SCOP) 1.3-1.5 mg (1 mg over 3 days) Place 1 patch onto the skin every 72 hours. (Patient not taking: Reported on 9/21/2020) 10 patch 1     No current facility-administered medications on file prior to visit.        REVIEW OF SYSTEMS:  Review of Systems   Constitutional: Negative.    HENT: Negative for hearing loss, nosebleeds and sore throat.    Respiratory: Negative for shortness of breath and wheezing.    Cardiovascular: Negative for chest pain and palpitations.   Gastrointestinal: Negative for heartburn, nausea and vomiting.   Genitourinary: Negative for dysuria and urgency.   Skin: Negative.    Neurological: Negative for seizures, loss of consciousness and weakness.       GENERAL PHYSICAL EXAM:   BP (!) 151/78 Comment: provider notified  Pulse 80   Ht 5' 2" (1.575 m)   Wt 67.5 kg (148 lb 13 oz)   BMI 27.22 kg/m²    GEN: well developed, well nourished, no acute distress   HENT: Normocephalic, atraumatic   EYES: No discharge, conjunctiva normal   NECK: Supple, non-tender   PULM: No wheezing, no respiratory distress   CV: RRR   ABD: Soft, non-tender    ORTHO EXAM:   Examination of the left hand and wrist reveals that there is no edema.  There are no skin changes.  Palpation produces no tenderness.  She reports grossly intact sensation in the median radial ulnar distribution.  She has 5/5 muscular strength of the thenar eminence.  She has a " negative Tinel's but a positive Durkan's test.  She has a 2+ radial pulse    RADIOLOGY:   Nerve conduction study has been reviewed.  She is noted have moderate to severe left carpal tunnel syndrome    ASSESSMENT:   Left carpal tunnel syndrome    PLAN:  1.  I have discussed treatment options with the patient.  I have discussed the risks and benefits of carpal tunnel release.  The patient has provided informed consent    2.  Will proceed with left carpal tunnel release under local anesthesia    3.  Follow up with me 2 weeks postoperatively

## 2020-09-21 NOTE — PATIENT INSTRUCTIONS
Surgery Instructions:     Your surgery is scheduled on 9/29/2020 at [address] surgery center: [address], second entrance.    The pre-op department will be in contact with you prior to your procedure to review medications and instructions.       Nothing to eat or drink after midnight prior to day of surgery.    You should STOP taking any blood thinners 7 days prior to surgery.     Please obtain medical and cardiac clearance as advised prior to surgery. All preoperative testing should be done as soon as possible as it may be needed to obtain clearance.    Your pre op COVID-19 test collection is scheduled for 9/26/2020 at 9:10 AM.  Please arrive at the drive thru at entrance 3 for this test collection.  You will not need to get out of your car.    The surgery center will contact you the day prior to surgery to advise you of your arrival time for surgery.     Your post op appointment is scheduled on 10/14/2020 at 9:40 AM.    You will be contacted by an automated text message every morning for for 14 days after your surgery inquiring if you have any COVID symptoms.  If you have any concerns regarding COVID please reply to the text message and then an [facility] On Call Registered Nurse will contact you later that day.

## 2020-09-22 RX ORDER — LIDOCAINE HYDROCHLORIDE 10 MG/ML
1 INJECTION, SOLUTION EPIDURAL; INFILTRATION; INTRACAUDAL; PERINEURAL ONCE
Status: CANCELLED | OUTPATIENT
Start: 2020-09-22 | End: 2020-09-22

## 2020-09-26 ENCOUNTER — LAB VISIT (OUTPATIENT)
Dept: FAMILY MEDICINE | Facility: CLINIC | Age: 70
End: 2020-09-26
Payer: MEDICARE

## 2020-09-26 DIAGNOSIS — Z01.818 PREOP TESTING: ICD-10-CM

## 2020-09-26 PROCEDURE — U0003 INFECTIOUS AGENT DETECTION BY NUCLEIC ACID (DNA OR RNA); SEVERE ACUTE RESPIRATORY SYNDROME CORONAVIRUS 2 (SARS-COV-2) (CORONAVIRUS DISEASE [COVID-19]), AMPLIFIED PROBE TECHNIQUE, MAKING USE OF HIGH THROUGHPUT TECHNOLOGIES AS DESCRIBED BY CMS-2020-01-R: HCPCS

## 2020-09-27 LAB — SARS-COV-2 RNA RESP QL NAA+PROBE: NOT DETECTED

## 2020-09-29 ENCOUNTER — HOSPITAL ENCOUNTER (OUTPATIENT)
Facility: HOSPITAL | Age: 70
Discharge: HOME | End: 2020-09-29
Attending: ORTHOPAEDIC SURGERY | Admitting: ORTHOPAEDIC SURGERY
Payer: MEDICARE

## 2020-09-29 ENCOUNTER — PATIENT MESSAGE (OUTPATIENT)
Dept: OTHER | Facility: OTHER | Age: 70
End: 2020-09-29

## 2020-09-29 DIAGNOSIS — Z01.818 PREOP TESTING: ICD-10-CM

## 2020-09-29 DIAGNOSIS — G56.02 LEFT CARPAL TUNNEL SYNDROME: ICD-10-CM

## 2020-09-29 PROCEDURE — 64721 PR REVISE MEDIAN N/CARPAL TUNNEL SURG: ICD-10-PCS | Mod: 79,LT,, | Performed by: ORTHOPAEDIC SURGERY

## 2020-09-29 PROCEDURE — 25000003 PHARM REV CODE 250: Mod: PO | Performed by: ORTHOPAEDIC SURGERY

## 2020-09-29 PROCEDURE — 36000707: Mod: PO | Performed by: ORTHOPAEDIC SURGERY

## 2020-09-29 PROCEDURE — 36000706: Mod: PO | Performed by: ORTHOPAEDIC SURGERY

## 2020-09-29 PROCEDURE — 71000015 HC POSTOP RECOV 1ST HR: Mod: PO | Performed by: ORTHOPAEDIC SURGERY

## 2020-09-29 PROCEDURE — 63600175 PHARM REV CODE 636 W HCPCS: Mod: PO | Performed by: ORTHOPAEDIC SURGERY

## 2020-09-29 PROCEDURE — 64721 CARPAL TUNNEL SURGERY: CPT | Mod: 79,LT,, | Performed by: ORTHOPAEDIC SURGERY

## 2020-09-29 RX ORDER — LIDOCAINE HYDROCHLORIDE 10 MG/ML
1 INJECTION, SOLUTION EPIDURAL; INFILTRATION; INTRACAUDAL; PERINEURAL ONCE
Status: DISCONTINUED | OUTPATIENT
Start: 2020-09-29 | End: 2020-09-29 | Stop reason: HOSPADM

## 2020-09-29 RX ORDER — LIDOCAINE HYDROCHLORIDE 10 MG/ML
INJECTION, SOLUTION EPIDURAL; INFILTRATION; INTRACAUDAL; PERINEURAL
Status: DISCONTINUED | OUTPATIENT
Start: 2020-09-29 | End: 2020-09-29 | Stop reason: HOSPADM

## 2020-09-29 RX ORDER — SODIUM CHLORIDE, SODIUM LACTATE, POTASSIUM CHLORIDE, CALCIUM CHLORIDE 600; 310; 30; 20 MG/100ML; MG/100ML; MG/100ML; MG/100ML
INJECTION, SOLUTION INTRAVENOUS CONTINUOUS
Status: DISCONTINUED | OUTPATIENT
Start: 2020-09-29 | End: 2020-09-29 | Stop reason: HOSPADM

## 2020-09-29 RX ORDER — CLINDAMYCIN PHOSPHATE 600 MG/50ML
600 INJECTION, SOLUTION INTRAVENOUS
Status: COMPLETED | OUTPATIENT
Start: 2020-09-29 | End: 2020-09-29

## 2020-09-29 RX ORDER — BUPIVACAINE HYDROCHLORIDE 2.5 MG/ML
INJECTION, SOLUTION EPIDURAL; INFILTRATION; INTRACAUDAL
Status: DISCONTINUED | OUTPATIENT
Start: 2020-09-29 | End: 2020-09-29 | Stop reason: HOSPADM

## 2020-09-29 RX ADMIN — CLINDAMYCIN IN 5 PERCENT DEXTROSE 600 MG: 12 INJECTION, SOLUTION INTRAVENOUS at 08:09

## 2020-09-29 RX ADMIN — SODIUM CHLORIDE, SODIUM LACTATE, POTASSIUM CHLORIDE, AND CALCIUM CHLORIDE: .6; .31; .03; .02 INJECTION, SOLUTION INTRAVENOUS at 08:09

## 2020-09-29 NOTE — DISCHARGE SUMMARY
[facility]  Discharge Note  Short Stay    Procedure(s) (LRB):  RELEASE, CARPAL TUNNEL (Left)    OUTCOME: Patient tolerated treatment/procedure well without complication and is now ready for discharge.    DISPOSITION: Home or Self Care    FINAL DIAGNOSIS:  Left carpal tunnel syndrome    FOLLOWUP: In clinic    DISCHARGE INSTRUCTIONS:    Discharge Procedure Orders   Diet general     Activity as tolerated     Keep surgical extremity elevated     Lifting restrictions     Call MD for:  temperature >100.4     Call MD for:  persistent nausea and vomiting     Call MD for:  severe uncontrolled pain     Call MD for:  difficulty breathing, headache or visual disturbances     Call MD for:  redness, tenderness, or signs of infection (pain, swelling, redness, odor or green/yellow discharge around incision site)     Call MD for:  hives     Call MD for:  persistent dizziness or light-headedness     Call MD for:  extreme fatigue     Leave dressing on - Keep it clean, dry, and intact until clinic visit

## 2020-09-29 NOTE — OP NOTE
Zoraida Castro  1950    DATE OF SURGERY: 9/29/2020     PRE-OPERATIVE DIAGNOSIS: left Carpal Tunnel Syndrome    POST-OPERATIVE DIAGNOSIS: left Carpal Tunnel Syndrome     ANESTHESIA TYPE: Local    BLOOD LOSS:  Less than 10 cc    TOURNIQUET TIME:  8 min    SURGEON: Dr. Estes    ASSISTANT: Denia De La Fuente    PROCEDURE: left Carpal Tunnel Release    IMPLANTS: None    SPECIMENS: None    INDICATION:     Ms. Castro presented to my clinic with a history of left carpal tunnel symptoms. Conservative treatments were initially tried. The patient did have relief with attempts at conservative treatment however has had a return of symptoms. An EMG and nerve conduction study has been performed. That study has shown compression of the median nerve at the wrist consistent with carpal tunnel syndrome. A discussion of the risks and benefits of carpal tunnel release has been performed, and informed consent for the procedure has been obtained.    PROCEDURE IN DETAIL:     Ms. Castro was transported to the operating room and was placed supine on the operating room table. All appropriate points were padded. The left hand and arm was prepped and draped in the normal sterile fashion. Time out was called. The correct patient, correct operative site, correct procedure, antibiotic administration which consisted of 600 mg of Cleocin, and allergies to medications which are to Penicillins  were reviewed. Time in was then called.     Attention was turned to left hand where a 3 cm incision was made in the palm of the hand. This was carried through the skin and subcutaneous tissues were dissected bluntly. The superficial palmar fascia was identified and was split in line with its fibers. The transverse carpal ligament was then identified and was incised for a distance of approximately 1 cm sharply.The median nerve was visualized and a carpal tunnel sled was placed below the transverse carpal ligament but above the median nerve. Blunt dissection  proximally over the transverse carpal ligament was performed and elevator was placed just above the transverse carpal ligament proximally. The ligament was identified. There were noted to be no penetrating nerve branches and at that point the carpal tunnel knife was used to incise the proximal transverse carpal ligament. Attention was then turned to the distal portion of the transverse carpal ligament. The carpal tunnel sled was again placed underneath the transverse carpal ligament but above the median nerve distally. Blunt dissection above the transverse carpal ligament distally was performed and an elevator was placed. The distal portion of the transverse carpal ligament was visualized and there were noted to be no penetrating branches of the nerve. At that point, tenotomy scissors were used to transect the distal portion of the transverse carpal ligament under direct visualization. The median nerve was then visualized. There were noted to be no specific lesions of the nerve and all of its branches were noted to be intact. The wound was then irrigated copiously. The tourniquet was let down and meticulous hemostasis was obtained with bipolar cautery. The wound was closed with 4-0 nylon suture superficially. The wound was then dressed with Xeroform, 4 x 4's, cast padding and a 3 inch Ace wrap was placed.      The patient was stable in the operating room and was transported to the recovery room in stable condition. All lap, needle, sponge, and equipment counts were correct at the end of the case.    POST-OPERATIVE PLAN:     The patient will keep a soft dressing in place for two weeks at which time she will followup with me. The dressing will be taken down, and the sutures will be removed at that time. She is not to get the dressing wet or to take it off. She will have a 2 pound weight limit of the left upper extremity for a total of 4 weeks.

## 2020-09-30 VITALS
BODY MASS INDEX: 26.22 KG/M2 | RESPIRATION RATE: 17 BRPM | OXYGEN SATURATION: 99 % | HEART RATE: 69 BPM | HEIGHT: 63 IN | DIASTOLIC BLOOD PRESSURE: 67 MMHG | TEMPERATURE: 97 F | WEIGHT: 148 LBS | SYSTOLIC BLOOD PRESSURE: 150 MMHG

## 2020-10-07 DIAGNOSIS — N95.1 MENOPAUSAL AND FEMALE CLIMACTERIC STATES: ICD-10-CM

## 2020-10-07 RX ORDER — MEDROXYPROGESTERONE ACETATE 2.5 MG/1
TABLET ORAL
Qty: 45 TABLET | Refills: 3 | Status: SHIPPED | OUTPATIENT
Start: 2020-10-07 | End: 2021-07-16

## 2020-10-12 ENCOUNTER — PATIENT MESSAGE (OUTPATIENT)
Dept: ORTHOPEDICS | Facility: CLINIC | Age: 70
End: 2020-10-12

## 2020-10-14 ENCOUNTER — OFFICE VISIT (OUTPATIENT)
Dept: ORTHOPEDICS | Facility: CLINIC | Age: 70
End: 2020-10-14
Payer: MEDICARE

## 2020-10-14 VITALS
HEART RATE: 91 BPM | WEIGHT: 147.94 LBS | SYSTOLIC BLOOD PRESSURE: 138 MMHG | HEIGHT: 63 IN | DIASTOLIC BLOOD PRESSURE: 67 MMHG | BODY MASS INDEX: 26.21 KG/M2

## 2020-10-14 DIAGNOSIS — G56.02 LEFT CARPAL TUNNEL SYNDROME: Primary | ICD-10-CM

## 2020-10-14 PROCEDURE — 99024 PR POST-OP FOLLOW-UP VISIT: ICD-10-PCS | Mod: POP,,, | Performed by: ORTHOPAEDIC SURGERY

## 2020-10-14 PROCEDURE — 99999 PR PBB SHADOW E&M-EST. PATIENT-LVL III: ICD-10-PCS | Mod: PBBFAC,,, | Performed by: ORTHOPAEDIC SURGERY

## 2020-10-14 PROCEDURE — 99999 PR PBB SHADOW E&M-EST. PATIENT-LVL III: CPT | Mod: PBBFAC,,, | Performed by: ORTHOPAEDIC SURGERY

## 2020-10-14 PROCEDURE — 99213 OFFICE O/P EST LOW 20 MIN: CPT | Mod: PBBFAC,PN | Performed by: ORTHOPAEDIC SURGERY

## 2020-10-14 PROCEDURE — 99024 POSTOP FOLLOW-UP VISIT: CPT | Mod: POP,,, | Performed by: ORTHOPAEDIC SURGERY

## 2020-10-14 NOTE — PROGRESS NOTES
Zoraida Castro is a 70 y.o. female who is approximately 2 weeks status post left carpal tunnel release. She is doing very well. She states that the majority of carpal tunnel symptoms are improved or resolved.    Physical exam: Examination of the left hand reveals that the incision is healing well. There is no significant edema,  erythema or drainage. Sensation is grossly intact median radial and ulnar distributions. Motor function of the thenar musculature is intact. Capillary refill less than 2 seconds in all of the digits. The Patient is able to make a full composite fist and fully extend the fingers without significant pain.    Assessment: Status post left carpal tunnel release    Plan:    1. Sutures were removed today and Steri-Strips are placed    2. Can begin hand washing in running water tomorrow    3. Continue a 2-3 pound weight limit to the arm and hand    4. Follow up with me in 2 weeks for repeat evaluation

## 2020-11-02 ENCOUNTER — OFFICE VISIT (OUTPATIENT)
Dept: ORTHOPEDICS | Facility: CLINIC | Age: 70
End: 2020-11-02
Payer: MEDICARE

## 2020-11-02 VITALS
WEIGHT: 147 LBS | DIASTOLIC BLOOD PRESSURE: 121 MMHG | SYSTOLIC BLOOD PRESSURE: 161 MMHG | HEIGHT: 63 IN | BODY MASS INDEX: 26.05 KG/M2 | HEART RATE: 84 BPM

## 2020-11-02 DIAGNOSIS — Z98.890 STATUS POST CARPAL TUNNEL RELEASE: Primary | ICD-10-CM

## 2020-11-02 PROCEDURE — 99024 PR POST-OP FOLLOW-UP VISIT: ICD-10-PCS | Mod: POP,,, | Performed by: ORTHOPAEDIC SURGERY

## 2020-11-02 PROCEDURE — 99999 PR PBB SHADOW E&M-EST. PATIENT-LVL III: ICD-10-PCS | Mod: PBBFAC,,, | Performed by: ORTHOPAEDIC SURGERY

## 2020-11-02 PROCEDURE — 99024 POSTOP FOLLOW-UP VISIT: CPT | Mod: POP,,, | Performed by: ORTHOPAEDIC SURGERY

## 2020-11-02 PROCEDURE — 99213 OFFICE O/P EST LOW 20 MIN: CPT | Mod: PBBFAC,PN | Performed by: ORTHOPAEDIC SURGERY

## 2020-11-02 PROCEDURE — 99999 PR PBB SHADOW E&M-EST. PATIENT-LVL III: CPT | Mod: PBBFAC,,, | Performed by: ORTHOPAEDIC SURGERY

## 2020-11-03 ENCOUNTER — PATIENT MESSAGE (OUTPATIENT)
Dept: FAMILY MEDICINE | Facility: CLINIC | Age: 70
End: 2020-11-03

## 2020-11-03 NOTE — PROGRESS NOTES
Ms. Castro returns to clinic today.  She is approximately 4-5 weeks status post left carpal tunnel release.  She is doing very well.  She states that her carpal tunnel symptoms have nearly resolved.  She has regained function in her hand.    Physical exam:  Examination left hand and wrist reveals the incision is healing well.  There is minimal edema.  There is no erythema or drainage.  Palpation about the hand does not produce significant tenderness.  She is able make a full composite fist and fully extend the fingers.  Sensation is grossly intact in the median radial ulnar distributions    Assessment:  Status post left carpal tunnel release    Plan:    1.  She is allowed to increase activity as tolerated    2.  She will follow up with me on a p.r.n. basis

## 2020-11-04 ENCOUNTER — PATIENT MESSAGE (OUTPATIENT)
Dept: FAMILY MEDICINE | Facility: CLINIC | Age: 70
End: 2020-11-04

## 2020-11-06 ENCOUNTER — IMMUNIZATION (OUTPATIENT)
Dept: FAMILY MEDICINE | Facility: CLINIC | Age: 70
End: 2020-11-06
Payer: MEDICARE

## 2020-11-06 PROCEDURE — G0008 ADMIN INFLUENZA VIRUS VAC: HCPCS | Mod: PBBFAC,PN

## 2020-11-06 PROCEDURE — 90694 VACC AIIV4 NO PRSRV 0.5ML IM: CPT | Mod: PBBFAC,PN

## 2020-12-11 ENCOUNTER — PATIENT MESSAGE (OUTPATIENT)
Dept: OTHER | Facility: OTHER | Age: 70
End: 2020-12-11

## 2020-12-31 ENCOUNTER — PATIENT MESSAGE (OUTPATIENT)
Dept: FAMILY MEDICINE | Facility: CLINIC | Age: 70
End: 2020-12-31

## 2021-01-04 ENCOUNTER — PATIENT MESSAGE (OUTPATIENT)
Dept: ADMINISTRATIVE | Facility: HOSPITAL | Age: 71
End: 2021-01-04

## 2021-01-09 ENCOUNTER — IMMUNIZATION (OUTPATIENT)
Dept: FAMILY MEDICINE | Facility: CLINIC | Age: 71
End: 2021-01-09
Payer: MEDICARE

## 2021-01-09 DIAGNOSIS — Z23 NEED FOR VACCINATION: ICD-10-CM

## 2021-01-09 PROCEDURE — 91300 COVID-19, MRNA, LNP-S, PF, 30 MCG/0.3 ML DOSE VACCINE: CPT | Mod: PBBFAC,PO

## 2021-01-30 ENCOUNTER — IMMUNIZATION (OUTPATIENT)
Dept: FAMILY MEDICINE | Facility: CLINIC | Age: 71
End: 2021-01-30
Payer: MEDICARE

## 2021-01-30 DIAGNOSIS — Z23 NEED FOR VACCINATION: Primary | ICD-10-CM

## 2021-01-30 PROCEDURE — 91300 COVID-19, MRNA, LNP-S, PF, 30 MCG/0.3 ML DOSE VACCINE: CPT | Mod: PBBFAC,PO

## 2021-01-30 PROCEDURE — 0002A COVID-19, MRNA, LNP-S, PF, 30 MCG/0.3 ML DOSE VACCINE: CPT | Mod: PBBFAC,PO

## 2021-04-06 ENCOUNTER — PATIENT MESSAGE (OUTPATIENT)
Dept: ADMINISTRATIVE | Facility: HOSPITAL | Age: 71
End: 2021-04-06

## 2021-04-14 ENCOUNTER — PATIENT MESSAGE (OUTPATIENT)
Dept: FAMILY MEDICINE | Facility: CLINIC | Age: 71
End: 2021-04-14

## 2021-05-26 ENCOUNTER — PATIENT MESSAGE (OUTPATIENT)
Dept: RADIOLOGY | Facility: HOSPITAL | Age: 71
End: 2021-05-26

## 2021-06-05 ENCOUNTER — HOSPITAL ENCOUNTER (OUTPATIENT)
Dept: RADIOLOGY | Facility: HOSPITAL | Age: 71
Discharge: HOME | End: 2021-06-05
Attending: FAMILY MEDICINE
Payer: MEDICARE

## 2021-06-05 DIAGNOSIS — Z12.31 SCREENING MAMMOGRAM FOR HIGH-RISK PATIENT: ICD-10-CM

## 2021-06-05 PROCEDURE — 77063 BREAST TOMOSYNTHESIS BI: CPT | Mod: 26,,, | Performed by: RADIOLOGY

## 2021-06-05 PROCEDURE — 77063 MAMMO DIGITAL SCREENING BILAT WITH TOMO: ICD-10-PCS | Mod: 26,,, | Performed by: RADIOLOGY

## 2021-06-05 PROCEDURE — 77067 MAMMO DIGITAL SCREENING BILAT WITH TOMO: ICD-10-PCS | Mod: 26,,, | Performed by: RADIOLOGY

## 2021-06-05 PROCEDURE — 77067 SCR MAMMO BI INCL CAD: CPT | Mod: 26,,, | Performed by: RADIOLOGY

## 2021-06-05 PROCEDURE — 77067 SCR MAMMO BI INCL CAD: CPT | Mod: TC,PO

## 2021-06-17 DIAGNOSIS — N95.1 MENOPAUSAL SYMPTOMS: ICD-10-CM

## 2021-06-17 RX ORDER — ESTRADIOL 0.5 MG/1
TABLET ORAL
Qty: 30 TABLET | Refills: 4 | Status: SHIPPED | OUTPATIENT
Start: 2021-06-17 | End: 2022-07-08

## 2021-09-20 ENCOUNTER — PATIENT MESSAGE (OUTPATIENT)
Dept: FAMILY MEDICINE | Facility: CLINIC | Age: 71
End: 2021-09-20

## 2021-09-21 ENCOUNTER — IMMUNIZATION (OUTPATIENT)
Dept: FAMILY MEDICINE | Facility: CLINIC | Age: 71
End: 2021-09-21
Payer: MEDICARE

## 2021-09-21 DIAGNOSIS — Z23 NEED FOR VACCINATION: Primary | ICD-10-CM

## 2021-09-21 PROCEDURE — 91300 COVID-19, MRNA, LNP-S, PF, 30 MCG/0.3 ML DOSE VACCINE: CPT | Mod: PBBFAC,PO

## 2021-09-21 PROCEDURE — 0003A COVID-19, MRNA, LNP-S, PF, 30 MCG/0.3 ML DOSE VACCINE: CPT | Mod: PBBFAC,PO

## 2021-10-08 ENCOUNTER — PATIENT MESSAGE (OUTPATIENT)
Dept: FAMILY MEDICINE | Facility: CLINIC | Age: 71
End: 2021-10-08

## 2021-10-14 ENCOUNTER — IMMUNIZATION (OUTPATIENT)
Dept: FAMILY MEDICINE | Facility: CLINIC | Age: 71
End: 2021-10-14
Payer: MEDICARE

## 2021-10-14 PROCEDURE — 90694 VACC AIIV4 NO PRSRV 0.5ML IM: CPT | Mod: PBBFAC,PN

## 2021-10-14 PROCEDURE — G0008 ADMIN INFLUENZA VIRUS VAC: HCPCS | Mod: PBBFAC,PN

## 2021-10-21 ENCOUNTER — PATIENT MESSAGE (OUTPATIENT)
Dept: FAMILY MEDICINE | Facility: CLINIC | Age: 71
End: 2021-10-21

## 2021-10-21 DIAGNOSIS — R73.9 HYPERGLYCEMIA: ICD-10-CM

## 2021-10-21 DIAGNOSIS — E78.5 HYPERLIPIDEMIA, UNSPECIFIED HYPERLIPIDEMIA TYPE: ICD-10-CM

## 2021-10-21 DIAGNOSIS — Z12.11 SPECIAL SCREENING FOR MALIGNANT NEOPLASMS, COLON: ICD-10-CM

## 2021-10-21 DIAGNOSIS — Z78.0 POSTMENOPAUSAL STATE: ICD-10-CM

## 2021-10-21 DIAGNOSIS — I10 ESSENTIAL HYPERTENSION: Primary | ICD-10-CM

## 2021-12-08 ENCOUNTER — PATIENT MESSAGE (OUTPATIENT)
Dept: ADMINISTRATIVE | Facility: HOSPITAL | Age: 71
End: 2021-12-08
Payer: MEDICARE

## 2021-12-08 ENCOUNTER — PATIENT OUTREACH (OUTPATIENT)
Dept: ADMINISTRATIVE | Facility: HOSPITAL | Age: 71
End: 2021-12-08
Payer: MEDICARE

## 2022-01-20 ENCOUNTER — PATIENT MESSAGE (OUTPATIENT)
Dept: FAMILY MEDICINE | Facility: CLINIC | Age: 72
End: 2022-01-20
Payer: MEDICARE

## 2022-01-20 DIAGNOSIS — I10 HYPERTENSION, UNSPECIFIED TYPE: ICD-10-CM

## 2022-01-20 RX ORDER — LISINOPRIL AND HYDROCHLOROTHIAZIDE 12.5; 2 MG/1; MG/1
TABLET ORAL
Qty: 90 TABLET | Refills: 0 | Status: SHIPPED | OUTPATIENT
Start: 2022-01-20 | End: 2022-05-03

## 2022-01-20 NOTE — TELEPHONE ENCOUNTER
Care Due:                  Date            Visit Type   Department     Provider  --------------------------------------------------------------------------------                                             [facility] FAMILY  Last Visit: 08-      None         MEDICINE       Mary Turner  Next Visit: None Scheduled  None         None Found                                                            Last  Test          Frequency    Reason                     Performed    Due Date  --------------------------------------------------------------------------------    Office Visit  12 months..  lisinopriL-hydrochlorothi  08- 08-                             azide....................    CMP.........  12 months..  lisinopriL-hydrochlorothi  Not Found    Overdue                             azide....................    Powered by [employer]. Reference number: [id number].   1/20/2022 12:27:31 PM CST

## 2022-02-08 ENCOUNTER — PATIENT OUTREACH (OUTPATIENT)
Dept: ADMINISTRATIVE | Facility: HOSPITAL | Age: 72
End: 2022-02-08
Payer: MEDICARE

## 2022-02-08 NOTE — PROGRESS NOTES
Dear Ms. Castro,    We are reaching out to you in reference to your hypertension management.    We are trying to find out if you have been monitoring you blood pressure at home, and if so what was your most recent reading?    If you do not have a blood pressure cuff at home we can schedule you to come in for a nurse visit.    You can send any readings that you have through your [employer] portal.    Please call if you have any questions.    Sincerely,    Mary Turner MD and your [facility]

## 2022-02-28 ENCOUNTER — PATIENT MESSAGE (OUTPATIENT)
Dept: ADMINISTRATIVE | Facility: HOSPITAL | Age: 72
End: 2022-02-28
Payer: MEDICARE

## 2022-04-26 ENCOUNTER — PATIENT MESSAGE (OUTPATIENT)
Dept: FAMILY MEDICINE | Facility: CLINIC | Age: 72
End: 2022-04-26
Payer: MEDICARE

## 2022-04-26 RX ORDER — SCOLOPAMINE TRANSDERMAL SYSTEM 1 MG/1
1 PATCH, EXTENDED RELEASE TRANSDERMAL
Qty: 10 PATCH | Refills: 1 | Status: SHIPPED | OUTPATIENT
Start: 2022-04-26 | End: 2022-06-20

## 2022-05-02 DIAGNOSIS — I10 HYPERTENSION, UNSPECIFIED TYPE: ICD-10-CM

## 2022-05-02 NOTE — TELEPHONE ENCOUNTER
Care Due:                  Date            Visit Type   Department     Provider  --------------------------------------------------------------------------------                                EP -                              PRIMARY      [facility] FAMILY  Last Visit: 08-      CARE ([facility])   EDER Turner                              EP -                              [facility]  Next Visit: 06-      CARE ([facility])   DEER Turner                                                            Last  Test          Frequency    Reason                     Performed    Due Date  --------------------------------------------------------------------------------    CMP.........  12 months..  lisinopriL-hydrochlorothi  Not Found    Overdue                             azide....................    Powered by [employer]. Reference number: [id number].   5/02/2022 8:14:57 AM CDT

## 2022-05-02 NOTE — TELEPHONE ENCOUNTER
Refill Routing Note   Medication(s) are not appropriate for processing by [facility] for the following reason(s):      - Patient has not been seen in over 15 months by PCP  - Required vitals are abnormal  - Patient has been seen in the ED/Hospital since the last PCP visit    [facility] action(s):  Route Medication-related problems identified: Requires labs        Medication reconciliation completed: No     Appointments  past 12m or future 3m with PCP    Date Provider   Last Visit   8/7/2020 Mary Turner MD   Next Visit   6/20/2022 Mary Turner MD   ED visits in past 90 days: 0        Note composed:1:52 PM 05/02/2022

## 2022-05-03 RX ORDER — LISINOPRIL AND HYDROCHLOROTHIAZIDE 12.5; 2 MG/1; MG/1
TABLET ORAL
Qty: 90 TABLET | Refills: 0 | Status: SHIPPED | OUTPATIENT
Start: 2022-05-03 | End: 2022-08-16

## 2022-06-06 ENCOUNTER — PATIENT MESSAGE (OUTPATIENT)
Dept: ADMINISTRATIVE | Facility: HOSPITAL | Age: 72
End: 2022-06-06
Payer: MEDICARE

## 2022-06-06 ENCOUNTER — PATIENT OUTREACH (OUTPATIENT)
Dept: ADMINISTRATIVE | Facility: HOSPITAL | Age: 72
End: 2022-06-06
Payer: MEDICARE

## 2022-06-06 NOTE — LETTER
June 13, 2022    Zoraida Castro  [address] [address]             [facility]  [address]  [place] [address]  Phone: [phone] Dear Jane [facility] is committed to your overall health.  To help you get the most out of each of your visits, we will review your information to make sure you are up to date on all of your recommended tests and/or procedures.       Mary Turner MD  has found that your chart shows you may be due for :         Health Maintenance Due   Topic    Lipid Panel     DEXA Scan     Colorectal Cancer Screening     COVID-19 Vaccine (4 - Booster for Pfizer series)    Mammogram          If you have had any of the above done at another facility, please bring the records or information with you so that your record at [facility] will be complete.  If you would like to schedule any of these test, please call [phone] or send a message via [email or web address].        If you are currently taking medication, please bring it with you to your appointment for review.           Thank you for letting us care for you,        Mary Turner MD and your [facility]

## 2022-06-06 NOTE — PROGRESS NOTES
2022 Care Everywhere updates requested and reviewed.  Immunizations reconciled. Media reports reviewed.  Duplicate HM overrides and  orders removed.  Overdue HM topic chart audit and/or requested.  Overdue lab testing linked to upcoming lab appointments if applies.  Lab marilyn, and [employer] reviewed   Lab testing     DIS reviewed      Mammogram and DEXA      Health Maintenance Due   Topic Date Due    Lipid Panel  2020    DEXA Scan  2021    Colorectal Cancer Screening  2021    COVID-19 Vaccine (4 - Booster for Pfizer series) 2021    Mammogram  2022

## 2022-06-13 ENCOUNTER — LAB VISIT (OUTPATIENT)
Dept: LAB | Facility: HOSPITAL | Age: 72
End: 2022-06-13
Attending: FAMILY MEDICINE
Payer: MEDICARE

## 2022-06-13 DIAGNOSIS — I10 ESSENTIAL HYPERTENSION: ICD-10-CM

## 2022-06-13 DIAGNOSIS — R73.9 HYPERGLYCEMIA: ICD-10-CM

## 2022-06-13 DIAGNOSIS — E78.5 HYPERLIPIDEMIA, UNSPECIFIED HYPERLIPIDEMIA TYPE: ICD-10-CM

## 2022-06-13 LAB
ALBUMIN SERPL BCP-MCNC: 3.8 G/DL (ref 3.5–5.2)
ALP SERPL-CCNC: 48 U/L (ref 55–135)
ALT SERPL W/O P-5'-P-CCNC: 14 U/L (ref 10–44)
ANION GAP SERPL CALC-SCNC: 10 MMOL/L (ref 8–16)
AST SERPL-CCNC: 24 U/L (ref 10–40)
BILIRUB SERPL-MCNC: 0.5 MG/DL (ref 0.1–1)
BUN SERPL-MCNC: 14 MG/DL (ref 8–23)
CALCIUM SERPL-MCNC: 9.6 MG/DL (ref 8.7–10.5)
CHLORIDE SERPL-SCNC: 103 MMOL/L (ref 95–110)
CHOLEST SERPL-MCNC: 249 MG/DL (ref 120–199)
CHOLEST/HDLC SERPL: 3.4 {RATIO} (ref 2–5)
CO2 SERPL-SCNC: 26 MMOL/L (ref 23–29)
CREAT SERPL-MCNC: 0.8 MG/DL (ref 0.5–1.4)
ERYTHROCYTE [DISTWIDTH] IN BLOOD BY AUTOMATED COUNT: 12.8 % (ref 11.5–14.5)
EST. GFR  (AFRICAN AMERICAN): >60 ML/MIN/1.73 M^2
EST. GFR  (NON AFRICAN AMERICAN): >60 ML/MIN/1.73 M^2
ESTIMATED AVG GLUCOSE: 103 MG/DL (ref 68–131)
GLUCOSE SERPL-MCNC: 97 MG/DL (ref 70–110)
HBA1C MFR BLD: 5.2 % (ref 4–5.6)
HCT VFR BLD AUTO: 42.7 % (ref 37–48.5)
HDLC SERPL-MCNC: 74 MG/DL (ref 40–75)
HDLC SERPL: 29.7 % (ref 20–50)
HGB BLD-MCNC: 13.6 G/DL (ref 12–16)
LDLC SERPL CALC-MCNC: 153.6 MG/DL (ref 63–159)
MCH RBC QN AUTO: 29.8 PG (ref 27–31)
MCHC RBC AUTO-ENTMCNC: 31.9 G/DL (ref 32–36)
MCV RBC AUTO: 94 FL (ref 82–98)
NONHDLC SERPL-MCNC: 175 MG/DL
PLATELET # BLD AUTO: 333 K/UL (ref 150–450)
PMV BLD AUTO: 10.3 FL (ref 9.2–12.9)
POTASSIUM SERPL-SCNC: 4.2 MMOL/L (ref 3.5–5.1)
PROT SERPL-MCNC: 6.8 G/DL (ref 6–8.4)
RBC # BLD AUTO: 4.56 M/UL (ref 4–5.4)
SODIUM SERPL-SCNC: 139 MMOL/L (ref 136–145)
TRIGL SERPL-MCNC: 107 MG/DL (ref 30–150)
TSH SERPL DL<=0.005 MIU/L-ACNC: 1.77 UIU/ML (ref 0.4–4)
WBC # BLD AUTO: 4.19 K/UL (ref 3.9–12.7)

## 2022-06-13 PROCEDURE — 85027 COMPLETE CBC AUTOMATED: CPT | Performed by: FAMILY MEDICINE

## 2022-06-13 PROCEDURE — 84443 ASSAY THYROID STIM HORMONE: CPT | Performed by: FAMILY MEDICINE

## 2022-06-13 PROCEDURE — 80061 LIPID PANEL: CPT | Performed by: FAMILY MEDICINE

## 2022-06-13 PROCEDURE — 80053 COMPREHEN METABOLIC PANEL: CPT | Performed by: FAMILY MEDICINE

## 2022-06-13 PROCEDURE — 83036 HEMOGLOBIN GLYCOSYLATED A1C: CPT | Performed by: FAMILY MEDICINE

## 2022-06-13 PROCEDURE — 36415 COLL VENOUS BLD VENIPUNCTURE: CPT | Mod: PN | Performed by: FAMILY MEDICINE

## 2022-06-20 ENCOUNTER — OFFICE VISIT (OUTPATIENT)
Dept: FAMILY MEDICINE | Facility: CLINIC | Age: 72
End: 2022-06-20
Payer: MEDICARE

## 2022-06-20 VITALS
HEIGHT: 63 IN | HEART RATE: 88 BPM | BODY MASS INDEX: 24.43 KG/M2 | SYSTOLIC BLOOD PRESSURE: 142 MMHG | DIASTOLIC BLOOD PRESSURE: 88 MMHG | WEIGHT: 137.88 LBS

## 2022-06-20 DIAGNOSIS — E78.5 HYPERLIPIDEMIA, UNSPECIFIED HYPERLIPIDEMIA TYPE: ICD-10-CM

## 2022-06-20 DIAGNOSIS — Z00.00 ROUTINE GENERAL MEDICAL EXAMINATION AT A HEALTH CARE FACILITY: ICD-10-CM

## 2022-06-20 DIAGNOSIS — I10 PRIMARY HYPERTENSION: Primary | ICD-10-CM

## 2022-06-20 DIAGNOSIS — Z12.31 SCREENING MAMMOGRAM FOR HIGH-RISK PATIENT: ICD-10-CM

## 2022-06-20 PROBLEM — G56.02 LEFT CARPAL TUNNEL SYNDROME: Status: RESOLVED | Noted: 2020-09-29 | Resolved: 2022-06-20

## 2022-06-20 PROBLEM — G56.03 CARPAL TUNNEL SYNDROME, BILATERAL: Status: RESOLVED | Noted: 2020-07-07 | Resolved: 2022-06-20

## 2022-06-20 PROCEDURE — 99213 OFFICE O/P EST LOW 20 MIN: CPT | Mod: PBBFAC,PN | Performed by: FAMILY MEDICINE

## 2022-06-20 PROCEDURE — 99999 PR PBB SHADOW E&M-EST. PATIENT-LVL III: CPT | Mod: PBBFAC,,, | Performed by: FAMILY MEDICINE

## 2022-06-20 PROCEDURE — 99214 OFFICE O/P EST MOD 30 MIN: CPT | Mod: S$PBB,,, | Performed by: FAMILY MEDICINE

## 2022-06-20 PROCEDURE — 99999 PR PBB SHADOW E&M-EST. PATIENT-LVL III: ICD-10-PCS | Mod: PBBFAC,,, | Performed by: FAMILY MEDICINE

## 2022-06-20 PROCEDURE — 99214 PR OFFICE/OUTPT VISIT, EST, LEVL IV, 30-39 MIN: ICD-10-PCS | Mod: S$PBB,,, | Performed by: FAMILY MEDICINE

## 2022-06-20 RX ORDER — EZETIMIBE 10 MG/1
10 TABLET ORAL DAILY
Qty: 90 TABLET | Refills: 3 | Status: SHIPPED | OUTPATIENT
Start: 2022-06-20 | End: 2023-03-14

## 2022-06-20 NOTE — PROGRESS NOTES
Subjective:       Patient ID: Zoraida Castro is a 72 y.o. female.    Chief Complaint: Annual Exam (States overdue for annual check up. Lab done)      Zoraida Castro is in the office for annual exam.    HPI  No updates to medical hx.   Past Medical History:   Diagnosis Date    Allergy     HBP (high blood pressure)     Headache(784.0)     Hearing loss     Menopausal symptoms     PONV (postoperative nausea and vomiting)          Current Outpatient Medications:     aspirin (ECOTRIN) 81 MG EC tablet, Take 81 mg by mouth every other day., Disp: , Rfl:     estradioL (ESTRACE) 0.5 MG tablet, TAKE 1/2 TABLET BY MOUTH ONCE DAILY, Disp: 30 tablet, Rfl: 4    lisinopriL-hydrochlorothiazide (PRINZIDE,ZESTORETIC) 20-12.5 mg per tablet, TAKE ONE TABLET BY MOUTH EVERY MORNING, Disp: 90 tablet, Rfl: 0    medroxyPROGESTERone (PROVERA) 2.5 MG tablet, TAKE 1/2 TABLET BY MOUTH ONCE DAILY, Disp: 45 tablet, Rfl: 3    multivitamin with minerals tablet, Take 1 tablet by mouth once daily., Disp: , Rfl:     calcium carb&cit-mag12-vit D3 (CALCIUM MAGNESIUM + D) 500-250-200 mg-mg-unit Tab, Take by mouth. 1.5 Tablet Oral Twice a day , Disp: , Rfl:     ezetimibe (ZETIA) 10 mg tablet, Take 1 tablet (10 mg total) by mouth once daily., Disp: 90 tablet, Rfl: 3    The 10-year ASCVD risk score ([place] CHIOMA Jr., et al., 2013) is: 21.1%    Values used to calculate the score:      Age: 72 years      Sex: Female      Is Non- : No      Diabetic: No      Tobacco smoker: No      Systolic Blood Pressure: 142 mmHg      Is BP treated: Yes      HDL Cholesterol: 74 mg/dL      Total Cholesterol: 249 mg/dL   Discussed cholesterol lowering. She's concerned about side effects of statin.     Lab Results   Component Value Date    HGBA1C 5.2 06/13/2022     Lab Results   Component Value Date    LDLCALC 153.6 06/13/2022    CREATININE 0.8 06/13/2022   labs 2022 rev.    Review of Systems   Constitutional: Positive for fatigue. Negative for  unexpected weight change.   HENT: Positive for congestion. Negative for hearing loss, rhinorrhea and trouble swallowing.    Eyes: Negative for discharge and visual disturbance.   Respiratory: Negative for cough and wheezing.    Cardiovascular: Negative for chest pain, palpitations and leg swelling.   Gastrointestinal: Negative for abdominal pain, blood in stool, constipation, diarrhea and nausea.        No gerd c/o, avoiding trigger foods   Endocrine: Negative for polydipsia and polyuria.   Genitourinary: Negative for difficulty urinating, dysuria, frequency (nighttime x 2) and hematuria.   Musculoskeletal: Negative for arthralgias, joint swelling and neck pain.        Walking for exercise   Skin: Negative for rash and wound.   Allergic/Immunologic: Positive for environmental allergies (had hay fever earlier this year).   Neurological: Negative for dizziness, weakness, light-headedness and headaches.   Psychiatric/Behavioral: Negative for dysphoric mood and sleep disturbance.       Objective:      Physical Exam  Vitals and nursing note reviewed.   Constitutional:       General: She is not in acute distress.     Appearance: Normal appearance. She is well-developed.   HENT:      Head: Normocephalic and atraumatic.      Right Ear: Tympanic membrane and external ear normal.      Left Ear: Tympanic membrane and external ear normal.      Nose: Nose normal.      Mouth/Throat:      Pharynx: No oropharyngeal exudate.   Eyes:      Conjunctiva/sclera: Conjunctivae normal.      Pupils: Pupils are equal, round, and reactive to light.   Neck:      Thyroid: No thyromegaly.   Cardiovascular:      Rate and Rhythm: Normal rate and regular rhythm.   Pulmonary:      Effort: Pulmonary effort is normal. No respiratory distress.      Breath sounds: Normal breath sounds. No wheezing.   Abdominal:      General: Bowel sounds are normal. There is no distension.      Palpations: Abdomen is soft. There is no mass.      Tenderness: There is no  abdominal tenderness. There is no guarding or rebound.   Musculoskeletal:         General: Normal range of motion.      Cervical back: Normal range of motion and neck supple.      Right lower leg: No edema.      Left lower leg: No edema.   Lymphadenopathy:      Cervical: No cervical adenopathy.   Skin:     General: Skin is warm and dry.   Neurological:      General: No focal deficit present.      Mental Status: She is alert and oriented to person, place, and time.      Cranial Nerves: No cranial nerve deficit.   Psychiatric:         Mood and Affect: Mood normal.         Behavior: Behavior normal.             Screening recommendations appropriate to age and health status were reviewed.    Assessment & Plan:    Primary hypertension  Comments:  controlled, cont regimen    Screening mammogram for high-risk patient  -     Mammo Digital Screening Bilat; Future; Expected date: 06/20/2022    Hyperlipidemia, unspecified hyperlipidemia type  Comments:  trial of zetia as she'd prefer to avoid statins if possible due to side effect concerns  Orders:  -     ezetimibe (ZETIA) 10 mg tablet; Take 1 tablet (10 mg total) by mouth once daily.  Dispense: 90 tablet; Refill: 3    Routine general medical examination at a health care facility  Comments:  anticipatory guidance reviewed

## 2022-06-21 ENCOUNTER — LAB VISIT (OUTPATIENT)
Dept: LAB | Facility: HOSPITAL | Age: 72
End: 2022-06-21
Attending: FAMILY MEDICINE
Payer: MEDICARE

## 2022-06-21 DIAGNOSIS — Z12.11 SPECIAL SCREENING FOR MALIGNANT NEOPLASMS, COLON: ICD-10-CM

## 2022-06-21 PROCEDURE — 82274 ASSAY TEST FOR BLOOD FECAL: CPT | Performed by: FAMILY MEDICINE

## 2022-06-28 LAB — HEMOCCULT STL QL IA: NEGATIVE

## 2022-07-18 ENCOUNTER — HOSPITAL ENCOUNTER (OUTPATIENT)
Dept: RADIOLOGY | Facility: HOSPITAL | Age: 72
Discharge: HOME | End: 2022-07-18
Attending: FAMILY MEDICINE
Payer: MEDICARE

## 2022-07-18 DIAGNOSIS — Z78.0 POSTMENOPAUSAL STATE: ICD-10-CM

## 2022-07-18 DIAGNOSIS — Z12.31 SCREENING MAMMOGRAM FOR HIGH-RISK PATIENT: ICD-10-CM

## 2022-07-18 PROCEDURE — 77063 BREAST TOMOSYNTHESIS BI: CPT | Mod: TC,PO

## 2022-07-18 PROCEDURE — 77063 BREAST TOMOSYNTHESIS BI: CPT | Mod: 26,,, | Performed by: RADIOLOGY

## 2022-07-18 PROCEDURE — 77080 DXA BONE DENSITY AXIAL: CPT | Mod: TC,PO

## 2022-07-18 PROCEDURE — 77067 SCR MAMMO BI INCL CAD: CPT | Mod: 26,,, | Performed by: RADIOLOGY

## 2022-07-18 PROCEDURE — 77063 MAMMO DIGITAL SCREENING BILAT WITH TOMO: ICD-10-PCS | Mod: 26,,, | Performed by: RADIOLOGY

## 2022-07-18 PROCEDURE — 77080 DEXA BONE DENSITY SPINE HIP: ICD-10-PCS | Mod: 26,,, | Performed by: RADIOLOGY

## 2022-07-18 PROCEDURE — 77067 MAMMO DIGITAL SCREENING BILAT WITH TOMO: ICD-10-PCS | Mod: 26,,, | Performed by: RADIOLOGY

## 2022-07-18 PROCEDURE — 77067 SCR MAMMO BI INCL CAD: CPT | Mod: TC,PO

## 2022-07-18 PROCEDURE — 77080 DXA BONE DENSITY AXIAL: CPT | Mod: 26,,, | Performed by: RADIOLOGY

## 2022-07-29 ENCOUNTER — PATIENT MESSAGE (OUTPATIENT)
Dept: ADMINISTRATIVE | Facility: HOSPITAL | Age: 72
End: 2022-07-29
Payer: MEDICARE

## 2022-08-15 DIAGNOSIS — N95.1 MENOPAUSAL AND FEMALE CLIMACTERIC STATES: ICD-10-CM

## 2022-08-15 DIAGNOSIS — I10 HYPERTENSION, UNSPECIFIED TYPE: ICD-10-CM

## 2022-08-15 NOTE — TELEPHONE ENCOUNTER
No new care gaps identified.  [facility] Embedded Care Gaps. Reference number: [id number]. 8/15/2022   10:25:16 AM CDT

## 2022-08-16 RX ORDER — LISINOPRIL AND HYDROCHLOROTHIAZIDE 12.5; 2 MG/1; MG/1
TABLET ORAL
Qty: 90 TABLET | Refills: 1 | Status: SHIPPED | OUTPATIENT
Start: 2022-08-16 | End: 2022-12-02

## 2022-08-16 RX ORDER — MEDROXYPROGESTERONE ACETATE 2.5 MG/1
TABLET ORAL
Qty: 45 TABLET | Refills: 1 | Status: SHIPPED | OUTPATIENT
Start: 2022-08-16 | End: 2022-12-02

## 2022-08-16 NOTE — TELEPHONE ENCOUNTER
Refill Routing Note   Medication(s) are not appropriate for processing by [facility] for the following reason(s):      - Outside of protocol  - Required vitals are abnormal    [facility] action(s):  Defer  Route          Medication reconciliation completed: No     Appointments  past 12m or future 3m with PCP    Date Provider   Last Visit   6/20/2022 Mary Turner MD   Next Visit   Visit date not found Mary Turner MD   ED visits in past 90 days: 0        Note composed:8:54 AM 08/16/2022

## 2022-09-28 ENCOUNTER — IMMUNIZATION (OUTPATIENT)
Dept: FAMILY MEDICINE | Facility: CLINIC | Age: 72
End: 2022-09-28
Payer: MEDICARE

## 2022-09-28 DIAGNOSIS — Z23 NEED FOR VACCINATION: Primary | ICD-10-CM

## 2022-09-28 PROCEDURE — 91312 COVID-19, MRNA, LNP-S, BIVALENT BOOSTER, PF, 30 MCG/0.3 ML DOSE: CPT | Mod: PBBFAC,PO

## 2022-09-28 PROCEDURE — 0124A COVID-19, MRNA, LNP-S, BIVALENT BOOSTER, PF, 30 MCG/0.3 ML DOSE: CPT | Mod: PBBFAC | Performed by: FAMILY MEDICINE

## 2022-10-12 ENCOUNTER — TELEPHONE (OUTPATIENT)
Dept: FAMILY MEDICINE | Facility: CLINIC | Age: 72
End: 2022-10-12
Payer: MEDICARE

## 2022-10-12 NOTE — TELEPHONE ENCOUNTER
----- Message from Siddharth Pacheco sent at 10/12/2022  7:30 AM CDT -----  Contact: Patient Portual  .Type:  Needs Medical Advice    Who Called: pt  Would the patient rather a call back or a response via [facility]? Call back  Best Call Back Number: [phone]  Additional Information: Pt. Is requesting an appt. For flu shot.

## 2022-10-13 ENCOUNTER — IMMUNIZATION (OUTPATIENT)
Dept: FAMILY MEDICINE | Facility: CLINIC | Age: 72
End: 2022-10-13
Payer: MEDICARE

## 2022-10-13 PROCEDURE — G0008 ADMIN INFLUENZA VIRUS VAC: HCPCS | Mod: PBBFAC,PN

## 2022-11-30 DIAGNOSIS — I10 HYPERTENSION, UNSPECIFIED TYPE: ICD-10-CM

## 2022-11-30 DIAGNOSIS — N95.1 MENOPAUSAL AND FEMALE CLIMACTERIC STATES: ICD-10-CM

## 2022-11-30 NOTE — TELEPHONE ENCOUNTER
Refill Routing Note   Medication(s) are not appropriate for processing by [facility] for the following reason(s):      - Outside of protocol  - Required vitals are abnormal    [facility] action(s):  Defer  Route          Medication reconciliation completed: No     Appointments  past 12m or future 3m with PCP    Date Provider   Last Visit   6/20/2022 Mary Turner MD   Next Visit   Visit date not found Mary Turner MD   ED visits in past 90 days: 0        Note composed:1:08 PM 11/30/2022

## 2022-11-30 NOTE — TELEPHONE ENCOUNTER
No new care gaps identified.  [facility] Embedded Care Gaps. Reference number: [id number]. 11/30/2022   8:30:39 AM CST

## 2022-12-02 RX ORDER — LISINOPRIL AND HYDROCHLOROTHIAZIDE 12.5; 2 MG/1; MG/1
TABLET ORAL
Qty: 90 TABLET | Refills: 1 | Status: SHIPPED | OUTPATIENT
Start: 2022-12-02 | End: 2023-06-20

## 2022-12-02 RX ORDER — MEDROXYPROGESTERONE ACETATE 2.5 MG/1
TABLET ORAL
Qty: 45 TABLET | Refills: 1 | Status: SHIPPED | OUTPATIENT
Start: 2022-12-02 | End: 2023-06-20

## 2023-02-19 NOTE — DISCHARGE INSTRUCTIONS
Procedure: left carpal tunnel release    1. Please keep the dressing clean, dry, and in place. Do not take it off and do not get it wet.    2. Please keep the left arm and hand elevated for the 1st 24-48 hours to prevent swelling    3. Flexion and extension of the exposed fingers is encouraged, but do not attempt to push off or lift more than 1-2 pounds with left arm or hand    4. Please limit weightbearing to the left hand to 1-2 pounds. Light activity such as brushing your teeth, using a fork, or lifting a small drink is allowed starting today.    5.  Please take ibuprofen over the counter as needed for pain    6. If there are any questions or concerns please call Dr. Estes's office at [phone]    7.  Follow up with Dr. Estes in 2 weeks      
Awake/Alert

## 2023-06-13 ENCOUNTER — TELEPHONE (OUTPATIENT)
Dept: FAMILY MEDICINE | Facility: CLINIC | Age: 73
End: 2023-06-13
Payer: MEDICARE

## 2023-06-13 DIAGNOSIS — R73.9 HYPERGLYCEMIA: ICD-10-CM

## 2023-06-13 DIAGNOSIS — Z12.31 SCREENING MAMMOGRAM FOR HIGH-RISK PATIENT: Primary | ICD-10-CM

## 2023-06-13 DIAGNOSIS — E78.5 HYPERLIPIDEMIA, UNSPECIFIED HYPERLIPIDEMIA TYPE: ICD-10-CM

## 2023-06-13 NOTE — TELEPHONE ENCOUNTER
----- Message from Tatyana Mathews sent at 6/13/2023  3:06 PM CDT -----  Contact: [phone] Patient  Type: Orders Request    What orders/ testing are being requested? Annual Labs and Annual Mammogram please    Is there a future appointment scheduled for the patient with PCP? yes    When?07/27/2023    Would you prefer a response via PageStitch? Please call patient to set up. Thank you    Comments:

## 2023-06-16 ENCOUNTER — PATIENT MESSAGE (OUTPATIENT)
Dept: FAMILY MEDICINE | Facility: CLINIC | Age: 73
End: 2023-06-16
Payer: MEDICARE

## 2023-06-16 DIAGNOSIS — I10 HBP (HIGH BLOOD PRESSURE): ICD-10-CM

## 2023-06-19 DIAGNOSIS — N95.1 MENOPAUSAL AND FEMALE CLIMACTERIC STATES: ICD-10-CM

## 2023-06-19 DIAGNOSIS — I10 HYPERTENSION, UNSPECIFIED TYPE: ICD-10-CM

## 2023-06-20 RX ORDER — MEDROXYPROGESTERONE ACETATE 2.5 MG/1
TABLET ORAL
Qty: 45 TABLET | Refills: 1 | Status: SHIPPED | OUTPATIENT
Start: 2023-06-20 | End: 2024-01-04

## 2023-06-20 RX ORDER — LISINOPRIL AND HYDROCHLOROTHIAZIDE 12.5; 2 MG/1; MG/1
TABLET ORAL
Qty: 90 TABLET | Refills: 1 | Status: SHIPPED | OUTPATIENT
Start: 2023-06-20 | End: 2023-08-28 | Stop reason: SDUPTHER

## 2023-07-03 ENCOUNTER — LAB VISIT (OUTPATIENT)
Dept: LAB | Facility: HOSPITAL | Age: 73
End: 2023-07-03
Attending: FAMILY MEDICINE
Payer: MEDICARE

## 2023-07-03 DIAGNOSIS — R73.9 HYPERGLYCEMIA: ICD-10-CM

## 2023-07-03 DIAGNOSIS — E78.5 HYPERLIPIDEMIA, UNSPECIFIED HYPERLIPIDEMIA TYPE: ICD-10-CM

## 2023-07-03 LAB
ALBUMIN SERPL BCP-MCNC: 3.9 G/DL (ref 3.5–5.2)
ALP SERPL-CCNC: 53 U/L (ref 55–135)
ALT SERPL W/O P-5'-P-CCNC: 17 U/L (ref 10–44)
ANION GAP SERPL CALC-SCNC: 14 MMOL/L (ref 8–16)
AST SERPL-CCNC: 29 U/L (ref 10–40)
BASOPHILS # BLD AUTO: 0.06 K/UL (ref 0–0.2)
BASOPHILS NFR BLD: 1.3 % (ref 0–1.9)
BILIRUB SERPL-MCNC: 0.6 MG/DL (ref 0.1–1)
BUN SERPL-MCNC: 16 MG/DL (ref 8–23)
CALCIUM SERPL-MCNC: 9.6 MG/DL (ref 8.7–10.5)
CHLORIDE SERPL-SCNC: 104 MMOL/L (ref 95–110)
CHOLEST SERPL-MCNC: 239 MG/DL (ref 120–199)
CHOLEST/HDLC SERPL: 3.1 {RATIO} (ref 2–5)
CO2 SERPL-SCNC: 24 MMOL/L (ref 23–29)
CREAT SERPL-MCNC: 0.8 MG/DL (ref 0.5–1.4)
DIFFERENTIAL METHOD: NORMAL
EOSINOPHIL # BLD AUTO: 0.2 K/UL (ref 0–0.5)
EOSINOPHIL NFR BLD: 3.7 % (ref 0–8)
ERYTHROCYTE [DISTWIDTH] IN BLOOD BY AUTOMATED COUNT: 12.6 % (ref 11.5–14.5)
EST. GFR  (NO RACE VARIABLE): >60 ML/MIN/1.73 M^2
ESTIMATED AVG GLUCOSE: 103 MG/DL (ref 68–131)
GLUCOSE SERPL-MCNC: 97 MG/DL (ref 70–110)
HBA1C MFR BLD: 5.2 % (ref 4–5.6)
HCT VFR BLD AUTO: 40.4 % (ref 37–48.5)
HDLC SERPL-MCNC: 76 MG/DL (ref 40–75)
HDLC SERPL: 31.8 % (ref 20–50)
HGB BLD-MCNC: 13.3 G/DL (ref 12–16)
IMM GRANULOCYTES # BLD AUTO: 0.01 K/UL (ref 0–0.04)
IMM GRANULOCYTES NFR BLD AUTO: 0.2 % (ref 0–0.5)
LDLC SERPL CALC-MCNC: 141.6 MG/DL (ref 63–159)
LYMPHOCYTES # BLD AUTO: 1.8 K/UL (ref 1–4.8)
LYMPHOCYTES NFR BLD: 39.4 % (ref 18–48)
MCH RBC QN AUTO: 30 PG (ref 27–31)
MCHC RBC AUTO-ENTMCNC: 32.9 G/DL (ref 32–36)
MCV RBC AUTO: 91 FL (ref 82–98)
MONOCYTES # BLD AUTO: 0.3 K/UL (ref 0.3–1)
MONOCYTES NFR BLD: 7.3 % (ref 4–15)
NEUTROPHILS # BLD AUTO: 2.2 K/UL (ref 1.8–7.7)
NEUTROPHILS NFR BLD: 48.1 % (ref 38–73)
NONHDLC SERPL-MCNC: 163 MG/DL
NRBC BLD-RTO: 0 /100 WBC
PLATELET # BLD AUTO: 345 K/UL (ref 150–450)
PMV BLD AUTO: 10.9 FL (ref 9.2–12.9)
POTASSIUM SERPL-SCNC: 4.4 MMOL/L (ref 3.5–5.1)
PROT SERPL-MCNC: 7.1 G/DL (ref 6–8.4)
RBC # BLD AUTO: 4.44 M/UL (ref 4–5.4)
SODIUM SERPL-SCNC: 142 MMOL/L (ref 136–145)
TRIGL SERPL-MCNC: 107 MG/DL (ref 30–150)
TSH SERPL DL<=0.005 MIU/L-ACNC: 1.61 UIU/ML (ref 0.4–4)
WBC # BLD AUTO: 4.65 K/UL (ref 3.9–12.7)

## 2023-07-03 PROCEDURE — 83036 HEMOGLOBIN GLYCOSYLATED A1C: CPT | Performed by: FAMILY MEDICINE

## 2023-07-03 PROCEDURE — 36415 COLL VENOUS BLD VENIPUNCTURE: CPT | Mod: PN | Performed by: FAMILY MEDICINE

## 2023-07-03 PROCEDURE — 84443 ASSAY THYROID STIM HORMONE: CPT | Performed by: FAMILY MEDICINE

## 2023-07-03 PROCEDURE — 85025 COMPLETE CBC W/AUTO DIFF WBC: CPT | Performed by: FAMILY MEDICINE

## 2023-07-03 PROCEDURE — 80053 COMPREHEN METABOLIC PANEL: CPT | Performed by: FAMILY MEDICINE

## 2023-07-03 PROCEDURE — 80061 LIPID PANEL: CPT | Performed by: FAMILY MEDICINE

## 2023-08-11 ENCOUNTER — HOSPITAL ENCOUNTER (OUTPATIENT)
Dept: RADIOLOGY | Facility: HOSPITAL | Age: 73
Discharge: HOME | End: 2023-08-11
Attending: FAMILY MEDICINE
Payer: MEDICARE

## 2023-08-11 DIAGNOSIS — Z12.31 SCREENING MAMMOGRAM FOR HIGH-RISK PATIENT: ICD-10-CM

## 2023-08-11 PROCEDURE — 77067 MAMMO DIGITAL SCREENING BILAT WITH TOMO: ICD-10-PCS | Mod: 26,,, | Performed by: RADIOLOGY

## 2023-08-11 PROCEDURE — 77067 SCR MAMMO BI INCL CAD: CPT | Mod: TC,PO

## 2023-08-11 PROCEDURE — 77063 MAMMO DIGITAL SCREENING BILAT WITH TOMO: ICD-10-PCS | Mod: 26,,, | Performed by: RADIOLOGY

## 2023-08-11 PROCEDURE — 77063 BREAST TOMOSYNTHESIS BI: CPT | Mod: 26,,, | Performed by: RADIOLOGY

## 2023-08-11 PROCEDURE — 77067 SCR MAMMO BI INCL CAD: CPT | Mod: 26,,, | Performed by: RADIOLOGY

## 2023-08-28 ENCOUNTER — OFFICE VISIT (OUTPATIENT)
Dept: FAMILY MEDICINE | Facility: CLINIC | Age: 73
End: 2023-08-28
Payer: MEDICARE

## 2023-08-28 VITALS
OXYGEN SATURATION: 98 % | DIASTOLIC BLOOD PRESSURE: 68 MMHG | HEART RATE: 67 BPM | SYSTOLIC BLOOD PRESSURE: 128 MMHG | WEIGHT: 139.88 LBS | HEIGHT: 63 IN | BODY MASS INDEX: 24.79 KG/M2

## 2023-08-28 DIAGNOSIS — Z00.00 ROUTINE GENERAL MEDICAL EXAMINATION AT A HEALTH CARE FACILITY: ICD-10-CM

## 2023-08-28 DIAGNOSIS — M19.049 OSTEOARTHRITIS OF FINGER, UNSPECIFIED LATERALITY: Primary | ICD-10-CM

## 2023-08-28 DIAGNOSIS — Z12.11 SPECIAL SCREENING FOR MALIGNANT NEOPLASMS, COLON: ICD-10-CM

## 2023-08-28 DIAGNOSIS — I10 HYPERTENSION, UNSPECIFIED TYPE: ICD-10-CM

## 2023-08-28 DIAGNOSIS — F32.5 MAJOR DEPRESSIVE DISORDER WITH SINGLE EPISODE, IN FULL REMISSION: ICD-10-CM

## 2023-08-28 DIAGNOSIS — E78.5 HYPERLIPIDEMIA, UNSPECIFIED HYPERLIPIDEMIA TYPE: ICD-10-CM

## 2023-08-28 PROCEDURE — 99999 PR PBB SHADOW E&M-EST. PATIENT-LVL IV: CPT | Mod: PBBFAC,,, | Performed by: FAMILY MEDICINE

## 2023-08-28 PROCEDURE — 93005 ELECTROCARDIOGRAM TRACING: CPT | Mod: PBBFAC,PN | Performed by: INTERNAL MEDICINE

## 2023-08-28 PROCEDURE — 99215 PR OFFICE/OUTPT VISIT, EST, LEVL V, 40-54 MIN: ICD-10-PCS | Mod: S$PBB,,, | Performed by: FAMILY MEDICINE

## 2023-08-28 PROCEDURE — 99214 OFFICE O/P EST MOD 30 MIN: CPT | Mod: PBBFAC,PN | Performed by: FAMILY MEDICINE

## 2023-08-28 PROCEDURE — 93010 EKG 12-LEAD: ICD-10-PCS | Mod: S$PBB,,, | Performed by: INTERNAL MEDICINE

## 2023-08-28 PROCEDURE — 99215 OFFICE O/P EST HI 40 MIN: CPT | Mod: S$PBB,,, | Performed by: FAMILY MEDICINE

## 2023-08-28 PROCEDURE — 99999 PR PBB SHADOW E&M-EST. PATIENT-LVL IV: ICD-10-PCS | Mod: PBBFAC,,, | Performed by: FAMILY MEDICINE

## 2023-08-28 PROCEDURE — 93010 ELECTROCARDIOGRAM REPORT: CPT | Mod: S$PBB,,, | Performed by: INTERNAL MEDICINE

## 2023-08-28 RX ORDER — LISINOPRIL AND HYDROCHLOROTHIAZIDE 12.5; 2 MG/1; MG/1
1 TABLET ORAL EVERY MORNING
Qty: 90 TABLET | Refills: 3 | Status: SHIPPED | OUTPATIENT
Start: 2023-08-28

## 2023-08-28 RX ORDER — EZETIMIBE 10 MG/1
10 TABLET ORAL DAILY
Qty: 90 TABLET | Refills: 3 | Status: SHIPPED | OUTPATIENT
Start: 2023-08-28

## 2023-08-28 NOTE — PROGRESS NOTES
Subjective:       Patient ID: Zoraida Castro is a 73 y.o. female.    Chief Complaint: Annual Exam      Zoraida Castro is in the office for annual exam.    HPI  Medical hx reviewed.   Past Medical History:   Diagnosis Date    Allergy     HBP (high blood pressure)     Headache(784.0)     Hearing loss     Menopausal symptoms     PONV (postoperative nausea and vomiting)      Yesterday, got dehydrated probably low blood sugar, had n/v since resolved. Following, she was very diligent with hydration, and is maintaining today. Feels well and sx have not recurred.  Last year (Nov), her hands were aching - not consistent with previous carpal tunnel. Discomfort along the MCP line, some swelling in the PIPs on a few fingers as well. Had an episode of trigger finger (L) that has since resolved with home remedies.       Current Outpatient Medications:     aspirin (ECOTRIN) 81 MG EC tablet, Take 81 mg by mouth every other day., Disp: , Rfl:     calcium carb&cit-mag12-vit D3 (CALCIUM MAGNESIUM + D) 500-250-200 mg-mg-unit Tab, Take by mouth. 1.5 Tablet Oral Twice a day , Disp: , Rfl:     estradioL (ESTRACE) 0.5 MG tablet, TAKE 1/2 TABLET BY MOUTH ONCE DAILY, Disp: 30 tablet, Rfl: 4    medroxyPROGESTERone (PROVERA) 2.5 MG tablet, TAKE 1/2 TABLET BY MOUTH ONCE DAILY, Disp: 45 tablet, Rfl: 1    multivitamin with minerals tablet, Take 1 tablet by mouth once daily., Disp: , Rfl:     ezetimibe (ZETIA) 10 mg tablet, Take 1 tablet (10 mg total) by mouth once daily., Disp: 90 tablet, Rfl: 3    lisinopriL-hydrochlorothiazide (PRINZIDE,ZESTORETIC) 20-12.5 mg per tablet, Take 1 tablet by mouth every morning., Disp: 90 tablet, Rfl: 3    The 10-year ASCVD risk score (Harvey WHITMAN, et al., 2019) is: 19.3%    Values used to calculate the score:      Age: 73 years      Sex: Female      Is Non- : No      Diabetic: No      Tobacco smoker: No      Systolic Blood Pressure: 128 mmHg      Is BP treated: Yes      HDL Cholesterol: 76  mg/dL      Total Cholesterol: 239 mg/dL     Lab Results   Component Value Date    HGBA1C 5.2 07/03/2023    HGBA1C 5.2 06/13/2022     Lab Results   Component Value Date    LDLCALC 141.6 07/03/2023    CREATININE 0.8 07/03/2023   Labs 2023 rev.    Pt cont zetia as she does not want to statins over concerns of side effects.     Review of Systems   Constitutional:  Positive for fatigue (stable, attributes to heat). Negative for unexpected weight change.   HENT:  Negative for hearing loss, rhinorrhea and trouble swallowing.    Eyes:  Negative for photophobia and visual disturbance.   Respiratory:  Negative for cough (occ morning cough/clearing), shortness of breath and wheezing.    Cardiovascular:  Negative for chest pain, palpitations and leg swelling.   Gastrointestinal:  Negative for abdominal pain, blood in stool, constipation and diarrhea.        No gerd c/o, avoiding trigger foods   Genitourinary:  Negative for difficulty urinating, dysuria, frequency (nighttime x 2) and hematuria.   Musculoskeletal:  Negative for arthralgias and joint swelling.        Walking for exercise   Skin:  Negative for rash and wound.   Neurological:  Negative for dizziness, light-headedness and headaches.   Psychiatric/Behavioral:  Negative for dysphoric mood and sleep disturbance.        Objective:      Physical Exam  Vitals and nursing note reviewed.   Constitutional:       General: She is not in acute distress.     Appearance: Normal appearance. She is well-developed.   HENT:      Head: Normocephalic and atraumatic.      Right Ear: Tympanic membrane and external ear normal.      Left Ear: Tympanic membrane and external ear normal.      Nose: Nose normal.      Mouth/Throat:      Pharynx: No oropharyngeal exudate.   Eyes:      Conjunctiva/sclera: Conjunctivae normal.      Pupils: Pupils are equal, round, and reactive to light.   Neck:      Thyroid: No thyromegaly.   Cardiovascular:      Rate and Rhythm: Normal rate and regular rhythm.    Pulmonary:      Effort: Pulmonary effort is normal. No respiratory distress.      Breath sounds: Normal breath sounds. No wheezing.   Abdominal:      General: Bowel sounds are normal. There is no distension.      Palpations: Abdomen is soft. There is no mass.      Tenderness: There is no abdominal tenderness. There is no guarding or rebound.   Musculoskeletal:         General: Normal range of motion.      Cervical back: Neck supple.      Right lower leg: No edema.      Left lower leg: No edema.   Lymphadenopathy:      Cervical: No cervical adenopathy.   Skin:     General: Skin is warm and dry.   Neurological:      General: No focal deficit present.      Mental Status: She is alert and oriented to person, place, and time.      Cranial Nerves: No cranial nerve deficit.   Psychiatric:         Mood and Affect: Mood normal.         Behavior: Behavior normal.             Screening recommendations appropriate to age and health status were reviewed.    Assessment & Plan:    Osteoarthritis of finger, unspecified laterality  -     Ambulatory referral/consult to Physical/Occupational Therapy; Future; Expected date: 09/04/2023  -     Rheumatoid Factor; Future; Expected date: 08/28/2023  -     Sedimentation rate; Future; Expected date: 08/28/2023  -     C-Reactive Protein; Future; Expected date: 08/28/2023  -     WINSTON Screen w/Reflex; Future; Expected date: 08/28/2023    Special screening for malignant neoplasms, colon  -     Fecal Immunochemical Test (iFOBT); Future; Expected date: 08/28/2023    Routine general medical examination at a health care facility  Comments:  anticipatory guidance reviewed    Major depressive disorder with single episode, in full remission  Comments:  doing well, currently no medications    Hyperlipidemia, unspecified hyperlipidemia type  Comments:  trial of zetia as she'd prefer to avoid statins if possible due to side effect concerns  Orders:  -     ezetimibe (ZETIA) 10 mg tablet; Take 1 tablet (10  mg total) by mouth once daily.  Dispense: 90 tablet; Refill: 3  -     EKG 12-lead; Future    Hypertension, unspecified type  -     lisinopriL-hydrochlorothiazide (PRINZIDE,ZESTORETIC) 20-12.5 mg per tablet; Take 1 tablet by mouth every morning.  Dispense: 90 tablet; Refill: 3        45 minutes of total time spent on the encounter, which includes face to face time and non-face to face time preparing to see the patient (eg. review of tests), obtaining and/or reviewing separately obtained history, documenting clinical information in the electronic health record, independently interpreting results (not separately reported), and communicating results to the patient/family/caregiver, or care coordination (not separately reported).

## 2023-09-05 ENCOUNTER — LAB VISIT (OUTPATIENT)
Dept: LAB | Facility: HOSPITAL | Age: 73
End: 2023-09-05
Attending: FAMILY MEDICINE
Payer: MEDICARE

## 2023-09-05 DIAGNOSIS — Z12.11 SPECIAL SCREENING FOR MALIGNANT NEOPLASMS, COLON: ICD-10-CM

## 2023-09-05 PROCEDURE — 82274 ASSAY TEST FOR BLOOD FECAL: CPT | Performed by: FAMILY MEDICINE

## 2023-09-07 LAB — HEMOCCULT STL QL IA: NEGATIVE

## 2023-09-18 ENCOUNTER — CLINICAL SUPPORT (OUTPATIENT)
Dept: REHABILITATION | Facility: HOSPITAL | Age: 73
End: 2023-09-18
Attending: FAMILY MEDICINE
Payer: MEDICARE

## 2023-09-18 DIAGNOSIS — M25.542 JOINT PAIN IN BOTH HANDS: ICD-10-CM

## 2023-09-18 DIAGNOSIS — M19.049 OSTEOARTHRITIS OF FINGER, UNSPECIFIED LATERALITY: ICD-10-CM

## 2023-09-18 DIAGNOSIS — M25.541 JOINT PAIN IN BOTH HANDS: ICD-10-CM

## 2023-09-18 DIAGNOSIS — M25.60 RANGE OF MOTION DEFICIT: ICD-10-CM

## 2023-09-18 PROCEDURE — 97018 PARAFFIN BATH THERAPY: CPT | Mod: PN

## 2023-09-18 PROCEDURE — 97110 THERAPEUTIC EXERCISES: CPT | Mod: PN

## 2023-09-18 PROCEDURE — 97165 OT EVAL LOW COMPLEX 30 MIN: CPT | Mod: PN

## 2023-09-18 NOTE — PLAN OF CARE
[facility]  Occupational Therapy Initial Evaluation      Name: Zoraida Castro  Clinic Number: [id number]    Therapy Diagnosis:   Encounter Diagnoses   Name Primary?    Osteoarthritis of finger, unspecified laterality     Joint pain in both hands     Range of motion deficit      Physician: Mary Turner MD    Physician Orders: Eval and Treat  Medical Diagnosis: M19.049 (ICD-10-CM) - Osteoarthritis of finger, unspecified laterality   Surgical Procedure and Date: N/A  Evaluation Date: 9/18/2023  Insurance Authorization Period Expiration: 08/27/2024   Plan of Care Certification Period: 9/18/2023 to 11/18/2023  Date of Return to MD: none scheduled  Visit # / Visits authorized: 1 / 1  FOTO: 1/Intake score: 56    Precautions:  Standard    Time In:12:00 pm  Time Out: 1:00 pm  Total Appointment Time (timed & untimed codes): 60 minutes    Subjective      Date of Onset: began in 11/2022 and then gradually became worse    History of Current Condition/Mechanism of Injury: Zoraida reports: that she had a trigger finger in her right long finger over the summer but the trigger resolved. She also reports a loss of extension of her PIP in both long fingers.    Falls: No    Involved Side: bilateral  Dominant Side: Ambidextrous    Mechanism of Injury: N/A  Surgical Procedure: Pt reports a history of CTR of bilateral hands in 2020.  Imaging: none     Prior Therapy: no    Pain:  Functional Pain Scale Rating 0-10:   5/10 on average  1/10 at best  6/10 at worst  Location: both hands  Description: Aching  Aggravating Factors: Lifting  Easing Factors: rest    Occupation:  retired  Working presently: home-maker  Duties: housework and yardwork    Functional Limitations/Social History:    Previous functional status includes: Independent with all ADLs.     Current Functional Status   Home/Living environment: lives alone    - 1 story home, 0 steps to enter    - DME: N/A      Limitation of Functional Status as  "follows:   ADLs/IADLs:     - Feeding: Independent    - Bathing: Independent    - Dressing/Grooming: Independent    - Home Management: Independent    - Driving: Independent     Leisure: Gardening and Needlework/Sewing    Patient's Goals for Therapy: "decrease pain and increase flexibility"    Past Medical History/Physical Systems Review:   Zoraida Castro  has a past medical history of Allergy, HBP (high blood pressure), Headache(784.0), Hearing loss, Menopausal symptoms, and PONV (postoperative nausea and vomiting).    Zoraida Castro  has a past surgical history that includes Tonsillectomy; Varicose vein surgery; Carpal tunnel release (Right, 7/7/2020); and Carpal tunnel release (Left, 9/29/2020).    Zoraida has a current medication list which includes the following prescription(s): aspirin, calcium carb,cit-mag12-vit d3, estradiol, ezetimibe, lisinopril-hydrochlorothiazide, medroxyprogesterone, and multivitamin with minerals.    Review of patient's allergies indicates:   Allergen Reactions    Penicillins Other (See Comments)        Objective      Observation/Appearance:  Skin intact and fatty tissue noted on dorsum of right middle finger.    Edema.   None present today but pt reports on occasion she will have mild swelling in BL hands.      Hand ROM. Measured in degrees.   9/18/2023 9/18/2023    Left Right        Index: DPC touch touch        Long:  MP 0/63 0/62              PIP 8/78 18/74              DIP 0/49 0/32              DENSON 182 150        Ring:   DPC touch touch        Small:  DPC touch touch        Thumb: DPC of small finger touch touch     Sensation:  Intact    Palpation:   Right/Left    Increased tenderness with palpation over volar MPs of both long fingers     Strength (Dyanmometer) and Pinch Strength (Pinch Gauge)  Measured in pounds and psi. Average of three trials.   9/18/2023 9/18/2023    Left Right   Rung II 19 18   Key Pinch 10 9   3pt Pinch 7 7   2pt Pinch 7 9         Intake Outcome Measure for " FOTO Hand Survey    Therapist reviewed FOTO scores for Zoraida Castro on 9/18/2023.   FOTO report - see Media section or FOTO account episode details.    Intake Score: 56%       Treatment      Total Treatment time (time-based codes) separate from Evaluation: 15 minutes    Zoraida received the treatments listed below:        supervised modalities after being cleared for contradictions: Paraffin with     hot pack for 10 minutes to bilateral hands.      therapeutic exercises to develop ROM and flexibility for BL hands for 15 minutes including:  AROM as follows: jt blocking long finger PIP/DIP, lumbrical plus, hook, composite fist, finger abd/add and finger ext x 10 reps each      Patient Education and Home Exercises      Education provided:   -role of OT, goals for OT, scheduling/cancellations, insurance limitations with patient.  -Additional Education provided: Paraffin with moist heat, joint protection    Written Home Exercises Provided: yes.  Exercises were reviewed and Zoraida was able to demonstrate them prior to the end of the session.    Zoraida demonstrated good  understanding of the education provided.     Pt was advised to perform these exercises free of pain, and to stop performing them if pain occurs.    See EMR under Patient Instructions for exercises provided 9/18/2023.    Assessment      Zoraida Castro is a 73 y.o. female referred to outpatient occupational therapy and presents with a medical diagnosis of osteoarthritis of finger.  She exhibits decreased range of motion, pain and decreased strength in both hands.  Will issue joint protection handout next session and begin with light activities.   Following medical record review it is determined that pt will benefit from occupational therapy services in order to maximize pain free and/or functional use of bilateral hands. The following goals were discussed with the patient and patient is in agreement with them as to be addressed in the treatment plan. The  patient's rehab potential is Good.     Anticipated barriers to occupational therapy: none    Plan of care discussed with patient: Yes  Patient's spiritual, cultural and educational needs considered and patient is agreeable to the plan of care and goals as stated below:     Medical Necessity is demonstrated by the following  Occupational Profile/History  Co-morbidities and personal factors that may impact the plan of care [x] LOW: Brief chart review  [] MODERATE: Expanded chart review   [] HIGH: Extensive chart review    Moderate / High Support Documentation: N/A     Examination  Performance deficits relating to physical, cognitive or psychosocial skills that result in activity limitations and/or participation restrictions  [x] LOW: addressing 1-3 Performance deficits  [] MODERATE: 3-5 Performance deficits  [] HIGH: 5+ Performance deficits (please support below)    Moderate / High Support Documentation:    Physical:  Joint Mobility  Muscle Power/Strength   Strength  Pinch Strength  Pain    Cognitive:  No Deficits    Psychosocial:    No Deficits     Treatment Options [x] LOW: Limited options  [] MODERATE: Several options  [] HIGH: Multiple options      Decision Making/ Complexity Score: low       The following goals were discussed with the patient and patient is in agreement with them as to be addressed in the treatment plan.     Goals:   Short Term (4 weeks on 10/18/2023):  1)   Patient to be IND with HEP and modalities for pain management.  2)   Increase DENSON of both long fingers 10 to 15 degrees to increase functional hand use for gardening.  3)   Increase  strength 3-5 lbs. to grasp objects.  4)   Increase pinch 1-3 psis for  opening containers.   5)   Decrease complaints of pain to  2 out of 10 at worst to increase functional hand use for ADL/work/leisure activities.    Long Term (by discharge):  1)   Pt will report 1 out of 10 pain with bilateral hand use.  2)   Patient to score at 67% or more on FOTO to  demonstrate improved perception of functional bilateral hand use.  3)   Pt will return to prior level of function for ADLs and household management.    Plan     Plan of Care Certification: 9/18/2023 to 11/18/2023.     Outpatient Occupational Therapy 2 times weekly for 8 weeks to include the following interventions: Paraffin, Fluidotherapy, Manual therapy/joint mobilizations, Modalities for pain management, US 3 mhz, Therapeutic exercises/activities., Strengthening, Orthotic Fabrication/Fit/Training, Joint Protection, and Activity Modification.    ARTEMIO Bell, CHT

## 2023-09-18 NOTE — PATIENT INSTRUCTIONS
"[facility] - OCCUPATIONAL THERAPY  HOME EXERCISE PROGRAM     Moist heat x 10 minutes, 2 x daily    Complete the following exercises with 10 repetitions each, 4 x/day.     AROM: DIP Flexion / Extension  Pinch middle knuckle to prevent bending. Bend end knuckle until stretch is felt.   Hold 3 seconds. Relax. Straighten finger as far as possible.    AROM: PIP Flexion / Extension  Pinch bottom knuckle  to prevent bending. Actively bend middle knuckle until stretch is felt.   Hold 3 seconds. Relax. Straighten finger as far as possible.    AROM: Isolated MCP Flexion / Extension ("Wave")   Bend only your large, bottom knuckles. Hold 3 seconds. Keep the tips of your fingers straight. Straighten fingers.    AROM: Isolated IPJ Flexion / Extension ("Hook")  Bend only your middle and end knuckles. Hold 3 seconds.   Straighten your fingers.     AROM: Composite Flexion / Extension ("Full Fist")  Bend every joint in your hand into a fist. Hold 3 seconds. Straighten your fingers.     AROM: Composte Extension ("Finger Lifts")  Lift your finger off of the table one at a time. Hold 3 seconds. Relax your finger.    AROM: Abduction / Adduction  With hand flat on table, spread all fingers apart, then bring them together as close as possible.    Copyright © I. All rights reserved.     Therapist: ARTEMIO Whalen CHT   "

## 2023-09-21 ENCOUNTER — CLINICAL SUPPORT (OUTPATIENT)
Dept: REHABILITATION | Facility: HOSPITAL | Age: 73
End: 2023-09-21
Payer: MEDICARE

## 2023-09-21 DIAGNOSIS — M25.542 JOINT PAIN IN BOTH HANDS: Primary | ICD-10-CM

## 2023-09-21 DIAGNOSIS — M25.541 JOINT PAIN IN BOTH HANDS: Primary | ICD-10-CM

## 2023-09-21 DIAGNOSIS — M25.60 RANGE OF MOTION DEFICIT: ICD-10-CM

## 2023-09-21 PROCEDURE — 97018 PARAFFIN BATH THERAPY: CPT | Mod: PN,59

## 2023-09-21 PROCEDURE — 97110 THERAPEUTIC EXERCISES: CPT | Mod: PN

## 2023-09-21 PROCEDURE — 97140 MANUAL THERAPY 1/> REGIONS: CPT | Mod: PN

## 2023-09-21 NOTE — PROGRESS NOTES
Occupational Therapy Daily Treatment Note     Date: 9/21/2023  Name: Zoraida Castro  Clinic Number: [id number]    Therapy Diagnosis:   Encounter Diagnoses   Name Primary?    Joint pain in both hands Yes    Range of motion deficit      Physician: Mary Turner MD     Physician Orders: Eval and Treat  Medical Diagnosis: M19.049 (ICD-10-CM) - Osteoarthritis of finger, unspecified laterality   Surgical Procedure and Date: N/A  Evaluation Date: 9/18/2023  Insurance Authorization Period Expiration: 08/27/2024   Plan of Care Certification Period: 9/18/2023 to 11/18/2023  Date of Return to MD: none scheduled  Visit # / Visits authorized: 2/20  FOTO: 1/Intake score: 56     Precautions:  Standard     Time In: 3:40 pm  Time Out: 4:20pm  Total Appointment Time (timed & untimed codes): 40 minutes    Subjective     Pt reports: she was compliant with home exercise program given last session.   Response to previous treatment:more motion  Functional change: unremarkable    Pain: 5/10  Location: bilateral hands      Objective      Hand AROM. Measured in degrees.    9/18/2023 9/18/2023     Left Right           Index: DPC touch touch           Long:  MP 0/63 0/62              PIP 8/78 18/74              DIP 0/49 0/32              DENSON 182 150           Ring:   DPC touch touch           Small:  DPC touch touch           Thumb: DPC of small finger touch touch         Palpation:   Right/Left     Increased tenderness with palpation over volar MPs of both long fingers      Strength (Dyanmometer) and Pinch Strength (Pinch Gauge)  Measured in pounds and psi. Average of three trials.    9/18/2023 9/18/2023     Left Right   Rung II 19 18   Key Pinch 10 9   3pt Pinch 7 7   2pt Pinch 7 9          supervised modalities after being cleared for contradictions: Paraffin with      hot pack for 10 minutes to bilateral hands.    Zoraida received manual therapy for 10 minutes after being cleared to contradictions:  -STM to  bilateral hands  IASTYM to volar MPs of right and left fingers      Zoraida received therapeutic exercises for 15 minutes  to BL hands including:  - jt blocking long finger PIP/DIP, lumbrical plus, hook, composite fist, finger abd/add and finger ext x 10 reps each     Reviewed joint protection and energy conservation techniques x 5 minutes.      Home Exercises and Education Provided     Education provided:   - Joint protection and energy conservation techniques  - Progress towards goals: Good    Written Home Exercises Provided: yes.  Exercises were reviewed and Zoraida was able to demonstrate them prior to the end of the session.  Zoraida demonstrated good  understanding of the education provided.     See EMR under Patient Instructions for exercises provided 9/21/2023.     Zoraida demonstrated good  understanding of the education provided.         Assessment   Zoraida Castro is a 73 y.o. female referred to outpatient occupational therapy and presents with a medical diagnosis of osteoarthritis of finger.  She exhibits decreased range of motion, pain and decreased strength in both hands. Pt is exhibiting more AROM in bilateral hands. She is still triggering in both long fingers so pt encouraged to continue with passive composite fisting. Pt states that she has ordered a jar opener and that she has modified lifting her groceries using a luggage cart.  Reviewed joint protection and energy conservation techniques and pt verbalized good understanding.   Pt would continue to benefit from skilled OT.      Zoraida is progressing well towards her goals and there are no updates to goals at this time. Pt prognosis is Good.     Pt will continue to benefit from skilled outpatient occupational therapy to address the deficits listed in the problem list on initial evaluation provide pt/family education and to maximize pt's level of independence in the home and community environment.     Anticipated barriers to occupational therapy: none    Pt's  spiritual, cultural and educational needs considered and pt agreeable to plan of care and goals.    Goals:  Short Term (4 weeks on 10/18/2023):  1)   Patient to be IND with HEP and modalities for pain management.-progressing  2)   Increase DENSON of both long fingers 10 to 15 degrees to increase functional hand use for gardening.-progressing  3)   Increase  strength 3-5 lbs. to grasp objects.-progressing  4)   Increase pinch 1-3 psis for  opening containers.-progressing   5)   Decrease complaints of pain to  2 out of 10 at worst to increase functional hand use for ADL/work/leisure activities.-progressing     Long Term (by discharge):  1)   Pt will report 1 out of 10 pain with bilateral hand use.-progressing  2)   Patient to score at 67% or more on FOTO to demonstrate improved perception of functional bilateral hand use.-progressing  3)   Pt will return to prior level of function for ADLs and household management.-progressing    Plan      Plan of Care Certification: 9/18/2023 to 11/18/2023.      Outpatient Occupational Therapy per initial POC. Treatment to include the following interventions: Paraffin, Fluidotherapy, Manual therapy/joint mobilizations, Modalities for pain management, US 3 mhz, Therapeutic exercises/activities., Strengthening, Orthotic Fabrication/Fit/Training, Joint Protection, and Activity Modification.     Discussed Plan of Care with patient: Yes  Updates/Grading for next session: Advance as tolerated.      ARTEMIO Bell, CHT

## 2023-09-21 NOTE — PATIENT INSTRUCTIONS
[facility], OCCUPATIONAL THERAPY  HOME EXERCISE PROGRAM                                     Arthritis Conservative Management  Joint Protection:  Use larger joints and muscles when possible  Stop activities before the point of discomfort  Decrease activities that cause pain that lasts more than 2 hours  Avoid activities that put strain on painful or stiff joints  Avoid a tight or prolonged grasp on objects  Balance Rest and Activity:  Rest before exhaustion  Take frequent, short breaks  Avoid activities that cannot be stopped  Avoid staying in one position for a long time  Alternate heavy and light activities  Take more breaks when inflammation is active  Allow extra time for activities--avoid rushing  Plan your day ahead of time  Eliminate unnecessary activities  Reduce the effort:  Avoid excessive loads. Dont be afraid to ask for help. Use appliances to make tasks easier.  Keep items near (i.e. keeping commonly used appliances on the counter)  Use prepared foods as needed.  Avoid low chairs  Maintain a healthy body weight  Freeze leftovers for an easy meal later  Sit while working when possible  Avoid positions of deformity:  Rheumatoid Arthritis (RA): avoid the use of the hands that push the fingers ulnarly, such as wringing a cloth or opening tight jars.   Osteoarthritis (OA): Avoid stress to the CMC joint, such as sustained pinching.      Helpful Tips: slide objects; use your palms instead of fingers, keeps heavy items close to your body, use built up handles, look for lightweight options, use wheels to roll objects, use scissors to open packages, use lever type handles, pizza cutters for cutting, electrical toothbrush, avoid small buttons or use Velcro, use pumps for soaps/shampoos, etc.

## 2023-09-25 ENCOUNTER — CLINICAL SUPPORT (OUTPATIENT)
Dept: REHABILITATION | Facility: HOSPITAL | Age: 73
End: 2023-09-25
Payer: MEDICARE

## 2023-09-25 DIAGNOSIS — M25.541 JOINT PAIN IN BOTH HANDS: Primary | ICD-10-CM

## 2023-09-25 DIAGNOSIS — M25.60 RANGE OF MOTION DEFICIT: ICD-10-CM

## 2023-09-25 DIAGNOSIS — M25.542 JOINT PAIN IN BOTH HANDS: Primary | ICD-10-CM

## 2023-09-25 PROCEDURE — 97140 MANUAL THERAPY 1/> REGIONS: CPT | Mod: PN

## 2023-09-25 PROCEDURE — 97110 THERAPEUTIC EXERCISES: CPT | Mod: PN

## 2023-09-25 PROCEDURE — 97018 PARAFFIN BATH THERAPY: CPT | Mod: PN

## 2023-09-25 PROCEDURE — 97035 APP MDLTY 1+ULTRASOUND EA 15: CPT | Mod: PN

## 2023-09-25 NOTE — PROGRESS NOTES
Occupational Therapy Daily Treatment Note     Date: 9/25/2023  Name: Zoraida Castro  Clinic Number: [id number]    Therapy Diagnosis:   Encounter Diagnoses   Name Primary?    Joint pain in both hands Yes    Range of motion deficit      Physician: Mary Turner MD     Physician Orders: Eval and Treat  Medical Diagnosis: M19.049 (ICD-10-CM) - Osteoarthritis of finger, unspecified laterality   Surgical Procedure and Date: N/A  Evaluation Date: 9/18/2023  Insurance Authorization Period Expiration: 08/27/2024   Plan of Care Certification Period: 9/18/2023 to 11/18/2023  Date of Return to MD: none scheduled  Visit # / Visits authorized: 3/20  FOTO: 1/Intake score: 56     Precautions:  Standard     Time In: 11:00 am  Time Out: 11:45 am  Total Appointment Time (timed & untimed codes): 45 minutes    Subjective     Pt reports: she was compliant with home exercise program given last session.   Response to previous treatment:more motion and pain no longer constant  Functional change: Pt states her jar opener decreases pain in her hands.     Pain: 5/10  Location: bilateral hands      Objective      Hand AROM. Measured in degrees.    9/18/2023 9/18/2023     Left Right           Index: DPC touch touch           Long:  MP 0/63 0/62              PIP 8/78 18/74              DIP 0/49 0/32              DENSON 182 150           Ring:   DPC touch touch           Small:  DPC touch touch           Thumb: DPC of small finger touch touch         Palpation:   Right/Left     Increased tenderness with palpation over volar MPs of both long fingers      Strength (Dyanmometer) and Pinch Strength (Pinch Gauge)  Measured in pounds and psi. Average of three trials.    9/18/2023 9/18/2023     Left Right   Rung II 19 18   Key Pinch 10 9   3pt Pinch 7 7   2pt Pinch 7 9          supervised modalities after being cleared for contradictions: Paraffin with      hot pack for 10 minutes to bilateral hands.    Zoraida received manual  therapy for 10 minutes after being cleared to contradictions:  -STM to bilateral hands  IASTYM to volar MPs of right and left fingers      Zoraida received therapeutic exercises for 15 minutes  to BL hands including:  - jt blocking long finger PIP/DIP, lumbrical plus, hook, composite fist, finger abd/add and finger ext x 10 reps each     Zoraida received direct contact modality for 8 minutes after being cleared for contradictions to BL hands including:  Patient received ultrasound to  volar MP area  of right and left long fingers to increase blood flow, circulation, tissue elasticity, pain management and for wound/scar management for 8 minutes (4 minutes each finger) @ 3 Mhz, Intensity .8 w/cm2 at 100* duty cycle.         Home Exercises and Education Provided     Education provided:   - cont HEP  - Progress towards goals: Good    Written Home Exercises Provided: yes.  Exercises were reviewed and Zoraida was able to demonstrate them prior to the end of the session.  Zoraida demonstrated good  understanding of the education provided.     See EMR under Patient Instructions for exercises provided 9/21/2023.     Zoraida demonstrated good  understanding of the education provided.         Assessment   Zoraida Castro is a 73 y.o. female referred to outpatient occupational therapy and presents with a medical diagnosis of osteoarthritis of finger.  She exhibits decreased range of motion, pain and decreased strength in both hands. Pt states that her pain in BL hands is not as constant. She is now using a jar opener which she states decreases pain in her hands. Initiated ultrasound to BL long fingers to increase blood flow, decrease inflammation and pain in BL long fingers. Pt tolerated all activities with no increase in pain. Pt would continue to benefit from skilled OT.      Zoraida is progressing well towards her goals and there are no updates to goals at this time. Pt prognosis is Good.     Pt will continue to benefit from skilled outpatient  occupational therapy to address the deficits listed in the problem list on initial evaluation provide pt/family education and to maximize pt's level of independence in the home and community environment.     Anticipated barriers to occupational therapy: none    Pt's spiritual, cultural and educational needs considered and pt agreeable to plan of care and goals.    Goals:  Short Term (4 weeks on 10/18/2023):  1)   Patient to be IND with HEP and modalities for pain management.-progressing  2)   Increase DENSON of both long fingers 10 to 15 degrees to increase functional hand use for gardening.-progressing  3)   Increase  strength 3-5 lbs. to grasp objects.-progressing  4)   Increase pinch 1-3 psis for  opening containers.-progressing   5)   Decrease complaints of pain to  2 out of 10 at worst to increase functional hand use for ADL/work/leisure activities.-progressing     Long Term (by discharge):  1)   Pt will report 1 out of 10 pain with bilateral hand use.-progressing  2)   Patient to score at 67% or more on FOTO to demonstrate improved perception of functional bilateral hand use.-progressing  3)   Pt will return to prior level of function for ADLs and household management.-progressing    Plan      Plan of Care Certification: 9/18/2023 to 11/18/2023.      Outpatient Occupational Therapy per initial POC. Treatment to include the following interventions: Paraffin, Fluidotherapy, Manual therapy/joint mobilizations, Modalities for pain management, US 3 mhz, Therapeutic exercises/activities., Strengthening, Orthotic Fabrication/Fit/Training, Joint Protection, and Activity Modification.     Discussed Plan of Care with patient: Yes  Updates/Grading for next session: Advance as tolerated.      ARTEMIO Bell, CHT

## 2023-09-28 ENCOUNTER — CLINICAL SUPPORT (OUTPATIENT)
Dept: REHABILITATION | Facility: HOSPITAL | Age: 73
End: 2023-09-28
Payer: MEDICARE

## 2023-09-28 DIAGNOSIS — M25.541 JOINT PAIN IN BOTH HANDS: Primary | ICD-10-CM

## 2023-09-28 DIAGNOSIS — M25.60 RANGE OF MOTION DEFICIT: ICD-10-CM

## 2023-09-28 DIAGNOSIS — M25.542 JOINT PAIN IN BOTH HANDS: Primary | ICD-10-CM

## 2023-09-28 PROCEDURE — 97035 APP MDLTY 1+ULTRASOUND EA 15: CPT | Mod: PN

## 2023-09-28 PROCEDURE — 97110 THERAPEUTIC EXERCISES: CPT | Mod: PN

## 2023-09-28 NOTE — PROGRESS NOTES
Occupational Therapy Daily Treatment Note     Date: 9/28/2023  Name: Zoraida Castro  Clinic Number: [id number]    Therapy Diagnosis:   Encounter Diagnoses   Name Primary?    Joint pain in both hands Yes    Range of motion deficit      Physician: Mary Turner MD     Physician Orders: Eval and Treat  Medical Diagnosis: M19.049 (ICD-10-CM) - Osteoarthritis of finger, unspecified laterality   Surgical Procedure and Date: N/A  Evaluation Date: 9/18/2023  Insurance Authorization Period Expiration: 08/27/2024   Plan of Care Certification Period: 9/18/2023 to 11/18/2023  Date of Return to MD: none scheduled  Visit # / Visits authorized: 4/20  FOTO: 1/Intake score: 56     Precautions:  Standard     Time In: 2:00 pm  Time Out: 2:45 pm  Total Appointment Time (timed & untimed codes): 45 minutes    Subjective     Pt reports: she was compliant with home exercise program given last session.   Response to previous treatment:more motion and pain no longer constant  Functional change: decreased pain, less triggering    Pain: 4/10  Location: bilateral hands      Objective      Hand AROM. Measured in degrees.    9/18/2023 9/18/2023     Left Right           Index: DPC touch touch           Long:  MP 0/63 0/62              PIP 8/78 18/74              DIP 0/49 0/32              DENSON 182 150           Ring:   DPC touch touch           Small:  DPC touch touch           Thumb: DPC of small finger touch touch         Palpation:   Right/Left     Increased tenderness with palpation over volar MPs of both long fingers      Strength (Dyanmometer) and Pinch Strength (Pinch Gauge)  Measured in pounds and psi. Average of three trials.    9/18/2023 9/18/2023     Left Right   Rung II 19 18   Key Pinch 10 9   3pt Pinch 7 7   2pt Pinch 7 9          supervised modalities after being cleared for contradictions: Paraffin with      hot pack for 10 minutes to bilateral hands.    Zoraida received manual therapy for 10 minutes  after being cleared to contradictions:  -STM to bilateral hands  IASTYM to volar MPs of right and left fingers      Zoraida received therapeutic exercises for 15 minutes  to BL hands including:  - jt blocking long finger PIP/DIP, lumbrical plus, hook, composite fist, finger abd/add and finger ext x 10 reps each     Zoraida received direct contact modality for 8 minutes after being cleared for contradictions to BL hands including:  Patient received ultrasound to  volar MP area  of right and left long fingers to increase blood flow, circulation, tissue elasticity, pain management and for wound/scar management for 8 minutes (4 minutes each finger) @ 3 Mhz, Intensity .8 w/cm2 at 100* duty cycle.         Home Exercises and Education Provided     Education provided:   - cont HEP  - Progress towards goals: Good    Written Home Exercises Provided: yes.  Exercises were reviewed and Zoraida was able to demonstrate them prior to the end of the session.  Zoraida demonstrated good  understanding of the education provided.     See EMR under Patient Instructions for exercises provided 9/21/2023.     Zoraida demonstrated good  understanding of the education provided.         Assessment   Zoraida Castro is a 73 y.o. female referred to outpatient occupational therapy and presents with a medical diagnosis of osteoarthritis of finger.  She exhibits decreased range of motion, pain and decreased strength in both hands.Pt reports decreased triggering and pain in BL hands. Pt tolerated all activities without difficulty today.    Pt would continue to benefit from skilled OT.      Zoraida is progressing well towards her goals and there are no updates to goals at this time. Pt prognosis is Good.     Pt will continue to benefit from skilled outpatient occupational therapy to address the deficits listed in the problem list on initial evaluation provide pt/family education and to maximize pt's level of independence in the home and community environment.      Anticipated barriers to occupational therapy: none    Pt's spiritual, cultural and educational needs considered and pt agreeable to plan of care and goals.    Goals:  Short Term (4 weeks on 10/18/2023):  1)   Patient to be IND with HEP and modalities for pain management.-progressing  2)   Increase DENSON of both long fingers 10 to 15 degrees to increase functional hand use for gardening.-progressing  3)   Increase  strength 3-5 lbs. to grasp objects.-progressing  4)   Increase pinch 1-3 psis for  opening containers.-progressing   5)   Decrease complaints of pain to  2 out of 10 at worst to increase functional hand use for ADL/work/leisure activities.-progressing     Long Term (by discharge):  1)   Pt will report 1 out of 10 pain with bilateral hand use.-progressing  2)   Patient to score at 67% or more on FOTO to demonstrate improved perception of functional bilateral hand use.-progressing  3)   Pt will return to prior level of function for ADLs and household management.-progressing    Plan:       Plan of Care Certification: 9/18/2023 to 11/18/2023.      Outpatient Occupational Therapy per initial POC. Treatment to include the following interventions: Paraffin, Fluidotherapy, Manual therapy/joint mobilizations, Modalities for pain management, US 3 mhz, Therapeutic exercises/activities., Strengthening, Orthotic Fabrication/Fit/Training, Joint Protection, and Activity Modification.     Discussed Plan of Care with patient: Yes  Updates/Grading for next session: Advance as tolerated.      ARTEMIO Bell, CHT

## 2023-10-02 ENCOUNTER — CLINICAL SUPPORT (OUTPATIENT)
Dept: REHABILITATION | Facility: HOSPITAL | Age: 73
End: 2023-10-02
Payer: MEDICARE

## 2023-10-02 DIAGNOSIS — M25.60 RANGE OF MOTION DEFICIT: ICD-10-CM

## 2023-10-02 DIAGNOSIS — M25.542 JOINT PAIN IN BOTH HANDS: Primary | ICD-10-CM

## 2023-10-02 DIAGNOSIS — M25.541 JOINT PAIN IN BOTH HANDS: Primary | ICD-10-CM

## 2023-10-02 PROCEDURE — 97035 APP MDLTY 1+ULTRASOUND EA 15: CPT | Mod: PN

## 2023-10-02 PROCEDURE — 97140 MANUAL THERAPY 1/> REGIONS: CPT | Mod: PN

## 2023-10-02 PROCEDURE — 97018 PARAFFIN BATH THERAPY: CPT | Mod: PN

## 2023-10-02 PROCEDURE — 97110 THERAPEUTIC EXERCISES: CPT | Mod: PN

## 2023-10-02 NOTE — PROGRESS NOTES
Occupational Therapy Daily Treatment Note     Date: 10/2/2023  Name: Zoraida Castro  Clinic Number: [id number]    Therapy Diagnosis:   Encounter Diagnoses   Name Primary?    Joint pain in both hands Yes    Range of motion deficit      Physician: Mary Turner MD     Physician Orders: Eval and Treat  Medical Diagnosis: M19.049 (ICD-10-CM) - Osteoarthritis of finger, unspecified laterality   Surgical Procedure and Date: N/A  Evaluation Date: 9/18/2023  Insurance Authorization Period Expiration: 08/27/2024   Plan of Care Certification Period: 9/18/2023 to 11/18/2023  Date of Return to MD: none scheduled  Visit # / Visits authorized: 5/20  FOTO: 1/Intake score: 56, 2/Score: 68     Precautions:  Standard     Time In: 11:00 am  Time Out:11:50 am  Total Appointment Time (timed & untimed codes): 50 minutes    Subjective     Pt reports: she was compliant with home exercise program given last session.   Response to previous treatment:more motion and pain no longer constant  Functional change: decreased pain, less triggering    Pain: 3/10  Location: bilateral hands      Objective      Hand AROM. Measured in degrees.    9/18/2023 9/18/2023     Left Right           Index: DPC touch touch           Long:  MP 0/63 0/62              PIP 8/78 18/74              DIP 0/49 0/32              DENSON 182 150           Ring:   DPC touch touch           Small:  DPC touch touch           Thumb: DPC of small finger touch touch         Palpation:   Right/Left     Increased tenderness with palpation over volar MPs of both long fingers      Strength (Dyanmometer) and Pinch Strength (Pinch Gauge)  Measured in pounds and psi. Average of three trials.    9/18/2023 9/18/2023     Left Right   Rung II 19 18   Key Pinch 10 9   3pt Pinch 7 7   2pt Pinch 7 9          supervised modalities after being cleared for contradictions: Paraffin with      hot pack for 10 minutes to bilateral hands.    Zoraida received manual therapy for  10 minutes after being cleared to contradictions:  -STM to bilateral hands  IASTYM to volar MPs of right and left fingers      Zoraida received therapeutic exercises for 22 minutes  to BL hands including:  - jt blocking long finger PIP/DIP, lumbrical plus, hook, composite fist, finger abd/add and finger ext x 10 reps each   -yellow sponge 2' each grasping    Zoraida received direct contact modality for 8 minutes after being cleared for contradictions to BL hands including:  Patient received ultrasound to  volar MP area  of right and left long fingers to increase blood flow, circulation, tissue elasticity, pain management and for wound/scar management for 8 minutes (4 minutes each finger) @ 3 Mhz, Intensity .8 w/cm2 at 100* duty cycle.         Home Exercises and Education Provided     Education provided:   - cont HEP,yellow sponge 3 x day  - Progress towards goals: Good    Written Home Exercises Provided: yes.  Exercises were reviewed and Zoraida was able to demonstrate them prior to the end of the session.  Zoraida demonstrated good  understanding of the education provided.     See EMR under Patient Instructions for exercises provided 9/21/2023.     Zoraida demonstrated good  understanding of the education provided.         Assessment   Zoraida Castro is a 73 y.o. female referred to outpatient occupational therapy and presents with a medical diagnosis of osteoarthritis of finger.  She exhibits decreased range of motion, pain and decreased strength in both hands.Pt reports decreased triggering and pain in BL hands. Advanced with light grasping using yellow sponge. Pt tolerated all activities without difficulty today.    Pt would continue to benefit from skilled OT.      Zoraida is progressing well towards her goals and there are no updates to goals at this time. Pt prognosis is Good.     Pt will continue to benefit from skilled outpatient occupational therapy to address the deficits listed in the problem list on initial evaluation  provide pt/family education and to maximize pt's level of independence in the home and community environment.     Anticipated barriers to occupational therapy: none    Pt's spiritual, cultural and educational needs considered and pt agreeable to plan of care and goals.    Goals:  Short Term (4 weeks on 10/18/2023):  1)   Patient to be IND with HEP and modalities for pain management.-Met 10/2/2023  2)   Increase DENSON of both long fingers 10 to 15 degrees to increase functional hand use for gardening.-progressing  3)   Increase  strength 3-5 lbs. to grasp objects.-progressing  4)   Increase pinch 1-3 psis for  opening containers.-progressing   5)   Decrease complaints of pain to  2 out of 10 at worst to increase functional hand use for ADL/work/leisure activities.-progressing     Long Term (by discharge):  1)   Pt will report 1 out of 10 pain with bilateral hand use.-progressing  2)   Patient to score at 67% or more on FOTO to demonstrate improved perception of functional bilateral hand use.- Met 10/2/2023  3)   Pt will return to prior level of function for ADLs and household management.-progressing    Plan: Measure next session.      Plan of Care Certification: 9/18/2023 to 11/18/2023.      Outpatient Occupational Therapy per initial POC. Treatment to include the following interventions: Paraffin, Fluidotherapy, Manual therapy/joint mobilizations, Modalities for pain management, US 3 mhz, Therapeutic exercises/activities., Strengthening, Orthotic Fabrication/Fit/Training, Joint Protection, and Activity Modification.     Discussed Plan of Care with patient: Yes  Updates/Grading for next session: Advance as tolerated.      ARTEMIO Bell, CHT

## 2023-10-03 DIAGNOSIS — N95.1 MENOPAUSAL SYMPTOMS: ICD-10-CM

## 2023-10-03 RX ORDER — ESTRADIOL 0.5 MG/1
TABLET ORAL
Qty: 30 TABLET | Refills: 4 | Status: SHIPPED | OUTPATIENT
Start: 2023-10-03

## 2023-10-05 ENCOUNTER — CLINICAL SUPPORT (OUTPATIENT)
Dept: REHABILITATION | Facility: HOSPITAL | Age: 73
End: 2023-10-05
Payer: MEDICARE

## 2023-10-05 ENCOUNTER — PATIENT MESSAGE (OUTPATIENT)
Dept: FAMILY MEDICINE | Facility: CLINIC | Age: 73
End: 2023-10-05
Payer: MEDICARE

## 2023-10-05 DIAGNOSIS — M25.60 RANGE OF MOTION DEFICIT: ICD-10-CM

## 2023-10-05 DIAGNOSIS — M25.541 JOINT PAIN IN BOTH HANDS: Primary | ICD-10-CM

## 2023-10-05 DIAGNOSIS — M25.542 JOINT PAIN IN BOTH HANDS: Primary | ICD-10-CM

## 2023-10-05 PROCEDURE — 97035 APP MDLTY 1+ULTRASOUND EA 15: CPT | Mod: PN

## 2023-10-05 PROCEDURE — 97110 THERAPEUTIC EXERCISES: CPT | Mod: PN

## 2023-10-05 PROCEDURE — 97022 WHIRLPOOL THERAPY: CPT | Mod: PN

## 2023-10-05 NOTE — PROGRESS NOTES
Occupational Therapy Daily Treatment Note     Date: 10/5/2023  Name: Zoraida Castro  Clinic Number: [id number]    Therapy Diagnosis:   Encounter Diagnoses   Name Primary?    Joint pain in both hands Yes    Range of motion deficit      Physician: Mary Turner MD     Physician Orders: Eval and Treat  Medical Diagnosis: M19.049 (ICD-10-CM) - Osteoarthritis of finger, unspecified laterality   Surgical Procedure and Date: N/A  Evaluation Date: 9/18/2023  Insurance Authorization Period Expiration: 08/27/2024   Plan of Care Certification Period: 9/18/2023 to 11/18/2023  Date of Return to MD: none scheduled  Visit # / Visits authorized: 6/20  FOTO: 1/Intake score: 56, 2/Score: 68     Precautions:  Standard     Time In: 11:00 am  Time Out:11:50 am  Total Appointment Time (timed & untimed codes): 50 minutes    Subjective     Pt reports: she was compliant with home exercise program given last session.   Response to previous treatment:more motion and pain no longer constant  Functional change: decreased pain, less triggering    Pain: 3/10  Location: bilateral hands      Objective      Hand AROM. Measured in degrees.    9/18/2023 9/18/2023 10/5/2023 10/5/2023     Left Right Left Right             Index: DPC touch touch touch touch             Long:  MP 0/63 0/62 0/71 0/73              PIP 8/78 18/74 3/93 13/88              DIP 0/49 0/32 0/63 0/59              DENSON 182 150 224 207             Ring:   DPC touch touch touch touch             Small:  DPC touch touch touch touch             Thumb: DPC of small finger touch touch touch touch         Palpation:   Right/Left     Increased tenderness with palpation over volar MPs of both long fingers      Strength (Dyanmometer) and Pinch Strength (Pinch Gauge)  Measured in pounds and psi. Average of three trials.    9/18/2023 9/18/2023     Left Right   Rung II 19 18   Key Pinch 10 9   3pt Pinch 7 7   2pt Pinch 7 9          supervised modalities after  being cleared for contradictions: Paraffin with      hot pack for 10 minutes to bilateral hands.    Zoraida received manual therapy for 10 minutes after being cleared to contradictions:  -STM to bilateral hands  IASTYM to volar MPs of right and left fingers      Zoraida received therapeutic exercises for 22 minutes  to BL hands including:  - jt blocking long finger PIP/DIP, lumbrical plus, hook, composite fist, finger abd/add and finger ext x 10 reps each   -yellow sponge 2' each grasping (at home)    Zoraida received direct contact modality for 8 minutes after being cleared for contradictions to BL hands including:  Patient received ultrasound to  volar MP area  of right and left long fingers to increase blood flow, circulation, tissue elasticity, pain management and for wound/scar management for 8 minutes (4 minutes each finger) @ 3 Mhz, Intensity .8 w/cm2 at 100* duty cycle.         Home Exercises and Education Provided     Education provided:   - cont HEP  - Progress towards goals: Good    Written Home Exercises Provided: yes.  Exercises were reviewed and Zoraida was able to demonstrate them prior to the end of the session.  Zoraida demonstrated good  understanding of the education provided.     See EMR under Patient Instructions for exercises provided 9/21/2023.     Zoraida demonstrated good  understanding of the education provided.         Assessment   Zoraida Castro is a 73 y.o. female referred to outpatient occupational therapy and presents with a medical diagnosis of osteoarthritis of finger.  She exhibits decreased range of motion, pain and decreased strength in both hands. Pt has made moderate gains with DENSON of both long fingers. Pt continues to state that she has decrease triggering in both fingers. Pt tolerated all activities without difficulty today.    Pt would continue to benefit from skilled OT.      Zoraida is progressing well towards her goals and there are no updates to goals at this time. Pt prognosis is Good.      Pt will continue to benefit from skilled outpatient occupational therapy to address the deficits listed in the problem list on initial evaluation provide pt/family education and to maximize pt's level of independence in the home and community environment.     Anticipated barriers to occupational therapy: none    Pt's spiritual, cultural and educational needs considered and pt agreeable to plan of care and goals.    Goals:  Short Term (4 weeks on 10/18/2023):  1)   Patient to be IND with HEP and modalities for pain management.-Met 10/2/2023  2)   Increase DENSON of both long fingers 10 to 15 degrees to increase functional hand use for gardening.-progressing  3)   Increase  strength 3-5 lbs. to grasp objects.-progressing  4)   Increase pinch 1-3 psis for  opening containers.-progressing   5)   Decrease complaints of pain to  2 out of 10 at worst to increase functional hand use for ADL/work/leisure activities.-progressing     Long Term (by discharge):  1)   Pt will report 1 out of 10 pain with bilateral hand use.-progressing  2)   Patient to score at 67% or more on FOTO to demonstrate improved perception of functional bilateral hand use.- Met 10/2/2023  3)   Pt will return to prior level of function for ADLs and household management.-progressing    Plan:       Plan of Care Certification: 9/18/2023 to 11/18/2023.      Outpatient Occupational Therapy per initial POC. Treatment to include the following interventions: Paraffin, Fluidotherapy, Manual therapy/joint mobilizations, Modalities for pain management, US 3 mhz, Therapeutic exercises/activities., Strengthening, Orthotic Fabrication/Fit/Training, Joint Protection, and Activity Modification.     Discussed Plan of Care with patient: Yes  Updates/Grading for next session: Advance as tolerated.      ARTEMIO Bell, CHT

## 2023-10-05 NOTE — TELEPHONE ENCOUNTER
You have to take progesterone if you're taking estrogen. So if still taking estrogen, even at lower doses, would continue the progesterone.

## 2023-10-09 ENCOUNTER — CLINICAL SUPPORT (OUTPATIENT)
Dept: REHABILITATION | Facility: HOSPITAL | Age: 73
End: 2023-10-09
Payer: MEDICARE

## 2023-10-09 DIAGNOSIS — M25.541 JOINT PAIN IN BOTH HANDS: Primary | ICD-10-CM

## 2023-10-09 DIAGNOSIS — M25.542 JOINT PAIN IN BOTH HANDS: Primary | ICD-10-CM

## 2023-10-09 DIAGNOSIS — M25.60 RANGE OF MOTION DEFICIT: ICD-10-CM

## 2023-10-09 PROCEDURE — 97110 THERAPEUTIC EXERCISES: CPT | Mod: PN

## 2023-10-09 PROCEDURE — 97035 APP MDLTY 1+ULTRASOUND EA 15: CPT | Mod: PN

## 2023-10-09 PROCEDURE — 97140 MANUAL THERAPY 1/> REGIONS: CPT | Mod: PN

## 2023-10-09 PROCEDURE — 97018 PARAFFIN BATH THERAPY: CPT | Mod: PN

## 2023-10-09 NOTE — PROGRESS NOTES
Occupational Therapy Daily Treatment Note     Date: 10/9/2023  Name: Zoraida Castro  Clinic Number: [id number]    Therapy Diagnosis:   Encounter Diagnoses   Name Primary?    Joint pain in both hands Yes    Range of motion deficit      Physician: Mary Turner MD     Physician Orders: Eval and Treat  Medical Diagnosis: M19.049 (ICD-10-CM) - Osteoarthritis of finger, unspecified laterality   Surgical Procedure and Date: N/A  Evaluation Date: 9/18/2023  Insurance Authorization Period Expiration: 08/27/2024   Plan of Care Certification Period: 9/18/2023 to 11/18/2023  Date of Return to MD: none scheduled  Visit # / Visits authorized: 7/20  FOTO: 1/Intake score: 56, 2/Score: 68     Precautions:  Standard     Time In: 11:00 am  Time Out:11:52 am  Total Appointment Time (timed & untimed codes): 52 minutes    Subjective     Pt reports: she was compliant with home exercise program given last session.   Response to previous treatment:more motion and pain no longer constant  Functional change: decreased pain, less triggering    Pain: 3/10  Location: bilateral hands      Objective      Hand AROM. Measured in degrees.    9/18/2023 9/18/2023 10/5/2023 10/5/2023     Left Right Left Right             Index: DPC touch touch touch touch             Long:  MP 0/63 0/62 0/71 0/73              PIP 8/78 18/74 3/93 13/88              DIP 0/49 0/32 0/63 0/59              DENSON 182 150 224 207             Ring:   DPC touch touch touch touch             Small:  DPC touch touch touch touch             Thumb: DPC of small finger touch touch touch touch         Palpation:   Right/Left     Increased tenderness with palpation over volar MPs of both long fingers      Strength (Dyanmometer) and Pinch Strength (Pinch Gauge)  Measured in pounds and psi. Average of three trials.    9/18/2023 9/18/2023     Left Right   Rung II 19 18   Key Pinch 10 9   3pt Pinch 7 7   2pt Pinch 7 9          supervised modalities after  being cleared for contradictions: Paraffin with      hot pack for 10 minutes to bilateral hands.    Zoraida received manual therapy for 10 minutes after being cleared to contradictions:  -STM to bilateral hands  IASTYM to volar MPs of right and left fingers      Zoraida received therapeutic exercises for 24 minutes  to BL hands including:  - jt blocking long finger PIP/DIP, lumbrical plus, hook, composite fist, finger abd/add and finger ext x 10 reps each   -yellow sponge 2' each grasping (at home)  -tan putty: 2' molding, 2' grasping    Zoraida received direct contact modality for 8 minutes after being cleared for contradictions to BL hands including:  Patient received ultrasound to  volar MP area  of right and left long fingers to increase blood flow, circulation, tissue elasticity, pain management and for wound/scar management for 8 minutes (4 minutes each finger) @ 3 Mhz, Intensity .8 w/cm2 at 100* duty cycle.         Home Exercises and Education Provided     Education provided:   - cont HEP, tan putty  - Progress towards goals: Good    Written Home Exercises Provided: yes.  Exercises were reviewed and Zoraida was able to demonstrate them prior to the end of the session.  Zoraida demonstrated good  understanding of the education provided.     See EMR under Patient Instructions for exercises provided 9/21/2023.     Zoraida demonstrated good  understanding of the education provided.         Assessment   Zoraida Castro is a 73 y.o. female referred to outpatient occupational therapy and presents with a medical diagnosis of osteoarthritis of finger. Advanced activities without difficulty today. Post tx, pt had a loose closed composite fist bilaterally.   Pt would continue to benefit from skilled OT.      Zoraida is progressing well towards her goals and there are no updates to goals at this time. Pt prognosis is Good.     Pt will continue to benefit from skilled outpatient occupational therapy to address the deficits listed in the  problem list on initial evaluation provide pt/family education and to maximize pt's level of independence in the home and community environment.     Anticipated barriers to occupational therapy: none    Pt's spiritual, cultural and educational needs considered and pt agreeable to plan of care and goals.    Goals:  Short Term (4 weeks on 10/18/2023):  1)   Patient to be IND with HEP and modalities for pain management.-Met 10/2/2023  2)   Increase DENSON of both long fingers 10 to 15 degrees to increase functional hand use for gardening.- Met 10/5/2023  3)   Increase  strength 3-5 lbs. to grasp objects.-progressing  4)   Increase pinch 1-3 psis for  opening containers.-progressing   5)   Decrease complaints of pain to  2 out of 10 at worst to increase functional hand use for ADL/work/leisure activities.-progressing     Long Term (by discharge):  1)   Pt will report 1 out of 10 pain with bilateral hand use.-progressing  2)   Patient to score at 67% or more on FOTO to demonstrate improved perception of functional bilateral hand use.- Met 10/2/2023  3)   Pt will return to prior level of function for ADLs and household management.-progressing    Plan:       Plan of Care Certification: 9/18/2023 to 11/18/2023.      Outpatient Occupational Therapy per initial POC. Treatment to include the following interventions: Paraffin, Fluidotherapy, Manual therapy/joint mobilizations, Modalities for pain management, US 3 mhz, Therapeutic exercises/activities., Strengthening, Orthotic Fabrication/Fit/Training, Joint Protection, and Activity Modification.     Discussed Plan of Care with patient: Yes  Updates/Grading for next session: Advance as tolerated.      ARTEMIO Bell, CHT

## 2023-10-12 ENCOUNTER — CLINICAL SUPPORT (OUTPATIENT)
Dept: REHABILITATION | Facility: HOSPITAL | Age: 73
End: 2023-10-12
Payer: MEDICARE

## 2023-10-12 DIAGNOSIS — M25.60 RANGE OF MOTION DEFICIT: ICD-10-CM

## 2023-10-12 DIAGNOSIS — M25.541 JOINT PAIN IN BOTH HANDS: Primary | ICD-10-CM

## 2023-10-12 DIAGNOSIS — M25.542 JOINT PAIN IN BOTH HANDS: Primary | ICD-10-CM

## 2023-10-12 PROCEDURE — 97035 APP MDLTY 1+ULTRASOUND EA 15: CPT | Mod: PN

## 2023-10-12 PROCEDURE — 97110 THERAPEUTIC EXERCISES: CPT | Mod: PN

## 2023-10-12 PROCEDURE — 97022 WHIRLPOOL THERAPY: CPT | Mod: PN

## 2023-10-12 NOTE — PROGRESS NOTES
Occupational Therapy Daily Treatment Note     Date: 10/12/2023  Name: Zoraida Castro  Clinic Number: [id number]    Therapy Diagnosis:   Encounter Diagnoses   Name Primary?    Joint pain in both hands Yes    Range of motion deficit      Physician: Mary Turner MD     Physician Orders: Eval and Treat  Medical Diagnosis: M19.049 (ICD-10-CM) - Osteoarthritis of finger, unspecified laterality   Surgical Procedure and Date: N/A  Evaluation Date: 9/18/2023  Insurance Authorization Period Expiration: 08/27/2024   Plan of Care Certification Period: 9/18/2023 to 11/18/2023  Date of Return to MD: none scheduled  Visit # / Visits authorized: 8/20  FOTO: 1/Intake score: 56, 2/Score: 68     Precautions:  Standard     Time In: 11:00 am  Time Out:11:52 am  Total Appointment Time (timed & untimed codes): 52 minutes    Subjective     Pt reports: she was compliant with home exercise program given last session.   Response to previous treatment:more motion and pain no longer constant  Functional change: decreased pain, less triggering    Pain: 3/10  Location: bilateral hands      Objective      Hand AROM. Measured in degrees.    9/18/2023 9/18/2023 10/5/2023 10/5/2023     Left Right Left Right             Index: DPC touch touch touch touch             Long:  MP 0/63 0/62 0/71 0/73              PIP 8/78 18/74 3/93 13/88              DIP 0/49 0/32 0/63 0/59              DENSON 182 150 224 207             Ring:   DPC touch touch touch touch             Small:  DPC touch touch touch touch             Thumb: DPC of small finger touch touch touch touch         Palpation:   Right/Left     Increased tenderness with palpation over volar MPs of both long fingers      Strength (Dyanmometer) and Pinch Strength (Pinch Gauge)  Measured in pounds and psi. Average of three trials.    9/18/2023 9/18/2023     Left Right   Rung II 19 18   Key Pinch 10 9   3pt Pinch 7 7   2pt Pinch 7 9          supervised modalities after  being cleared for contradictions: Paraffin with      hot pack for 10 minutes to bilateral hands.    Zoraida received manual therapy for 10 minutes after being cleared to contradictions:  -STM to bilateral hands  IASTYM to volar MPs of right and left fingers      Zoraida received therapeutic exercises for 24 minutes  to BL hands including:  - jt blocking long finger PIP/DIP, lumbrical plus, hook, composite fist, finger abd/add and finger ext x 10 reps each   -yellow sponge 2' each grasping (at home)  -tan putty: 2' molding, 2' grasping (at home)  -hand gripper 2 yellow bands x 1'  -yellow digiflex x 1'  -yellow digi-extend x 1'      Zoraida received direct contact modality for 8 minutes after being cleared for contradictions to BL hands including:  Patient received ultrasound to  volar MP area  of right and left long fingers to increase blood flow, circulation, tissue elasticity, pain management and for wound/scar management for 8 minutes (4 minutes each finger) @ 3 Mhz, Intensity .8 w/cm2 at 100* duty cycle.         Home Exercises and Education Provided     Education provided:   - cont HEP  - Progress towards goals: Good    Written Home Exercises Provided: yes.  Exercises were reviewed and Zoraida was able to demonstrate them prior to the end of the session.  Zoraida demonstrated good  understanding of the education provided.     See EMR under Patient Instructions for exercises provided 9/21/2023.     Zoraida demonstrated good  understanding of the education provided.         Assessment   Zoraida Castro is a 73 y.o. female referred to outpatient occupational therapy and presents with a medical diagnosis of osteoarthritis of finger. Advanced activities without difficulty today. Post tx, pt had a loose closed composite fist bilaterally.   Pt would continue to benefit from skilled OT.      Zoraida is progressing well towards her goals and there are no updates to goals at this time. Pt prognosis is Good.     Pt will continue to benefit from  skilled outpatient occupational therapy to address the deficits listed in the problem list on initial evaluation provide pt/family education and to maximize pt's level of independence in the home and community environment.     Anticipated barriers to occupational therapy: none    Pt's spiritual, cultural and educational needs considered and pt agreeable to plan of care and goals.    Goals:  Short Term (4 weeks on 10/18/2023):  1)   Patient to be IND with HEP and modalities for pain management.-Met 10/2/2023  2)   Increase DENSON of both long fingers 10 to 15 degrees to increase functional hand use for gardening.- Met 10/5/2023  3)   Increase  strength 3-5 lbs. to grasp objects.-progressing  4)   Increase pinch 1-3 psis for  opening containers.-progressing   5)   Decrease complaints of pain to  2 out of 10 at worst to increase functional hand use for ADL/work/leisure activities.-progressing     Long Term (by discharge):  1)   Pt will report 1 out of 10 pain with bilateral hand use.-progressing  2)   Patient to score at 67% or more on FOTO to demonstrate improved perception of functional bilateral hand use.- Met 10/2/2023  3)   Pt will return to prior level of function for ADLs and household management.-progressing    Plan:       Plan of Care Certification: 9/18/2023 to 11/18/2023.      Outpatient Occupational Therapy per initial POC. Treatment to include the following interventions: Paraffin, Fluidotherapy, Manual therapy/joint mobilizations, Modalities for pain management, US 3 mhz, Therapeutic exercises/activities., Strengthening, Orthotic Fabrication/Fit/Training, Joint Protection, and Activity Modification.     Discussed Plan of Care with patient: Yes  Updates/Grading for next session: Advance as tolerated.      ARTEMIO Bell, CHT

## 2023-10-16 ENCOUNTER — CLINICAL SUPPORT (OUTPATIENT)
Dept: REHABILITATION | Facility: HOSPITAL | Age: 73
End: 2023-10-16
Payer: MEDICARE

## 2023-10-16 DIAGNOSIS — M25.541 JOINT PAIN IN BOTH HANDS: Primary | ICD-10-CM

## 2023-10-16 DIAGNOSIS — M25.542 JOINT PAIN IN BOTH HANDS: Primary | ICD-10-CM

## 2023-10-16 DIAGNOSIS — M25.60 RANGE OF MOTION DEFICIT: ICD-10-CM

## 2023-10-16 PROCEDURE — 97035 APP MDLTY 1+ULTRASOUND EA 15: CPT | Mod: PN

## 2023-10-16 PROCEDURE — 97110 THERAPEUTIC EXERCISES: CPT | Mod: PN

## 2023-10-16 PROCEDURE — 97140 MANUAL THERAPY 1/> REGIONS: CPT | Mod: PN

## 2023-10-16 NOTE — PROGRESS NOTES
Occupational Therapy Daily Treatment Note     Date: 10/16/2023  Name: Zoraida Castro  Clinic Number: [id number]    Therapy Diagnosis:   Encounter Diagnoses   Name Primary?    Joint pain in both hands Yes    Range of motion deficit      Physician: Mary Turner MD     Physician Orders: Eval and Treat  Medical Diagnosis: M19.049 (ICD-10-CM) - Osteoarthritis of finger, unspecified laterality   Surgical Procedure and Date: N/A  Evaluation Date: 9/18/2023  Insurance Authorization Period Expiration: 08/27/2024   Plan of Care Certification Period: 9/18/2023 to 11/18/2023  Date of Return to MD: none scheduled  Visit # / Visits authorized: 9/20  FOTO: 1/Intake score: 56, 2/Score: 68     Precautions:  Standard     Time In: 11:10 am  Time Out:12:02 pm  Total Appointment Time (timed & untimed codes): 52 minutes    Subjective     Pt reports: she was compliant with home exercise program given last session.   Response to previous treatment:more motion and pain no longer constant  Functional change: decreased pain, less triggering    Pain: 3/10  Location: bilateral hands      Objective      Hand AROM. Measured in degrees.    9/18/2023 9/18/2023 10/5/2023 10/5/2023     Left Right Left Right             Index: DPC touch touch touch touch             Long:  MP 0/63 0/62 0/71 0/73              PIP 8/78 18/74 3/93 13/88              DIP 0/49 0/32 0/63 0/59              DENSON 182 150 224 207             Ring:   DPC touch touch touch touch             Small:  DPC touch touch touch touch             Thumb: DPC of small finger touch touch touch touch         Palpation:   Right/Left     Increased tenderness with palpation over volar MPs of both long fingers      Strength (Dyanmometer) and Pinch Strength (Pinch Gauge)  Measured in pounds and psi. Average of three trials.    9/18/2023 9/18/2023     Left Right   Rung II 19 18   Key Pinch 10 9   3pt Pinch 7 7   2pt Pinch 7 9          supervised modalities after  being cleared for contradictions:      hot pack for 10 minutes to bilateral hands.    Zoraida received manual therapy for 10 minutes after being cleared to contradictions:  -STM to bilateral hands  IASTYM to volar MPs of right and left fingers      Zoraida received therapeutic exercises for 24 minutes  to BL hands including:  - jt blocking long finger PIP/DIP, lumbrical plus, hook, composite fist, finger abd/add and finger ext x 10 reps each   -yellow sponge 2' each grasping (at home)  -tan putty: 2' molding, 2' grasping (at home)  -hand gripper 2 yellow bands x 1'  -yellow digiflex x 1'  -yellow digi-extend x 1'      Zoraida received direct contact modality for 8 minutes after being cleared for contradictions to BL hands including:  Patient received ultrasound to  volar MP area  of right and left long fingers to increase blood flow, circulation, tissue elasticity, pain management and for wound/scar management for 8 minutes (4 minutes each finger) @ 3 Mhz, Intensity .8 w/cm2 at 100* duty cycle.         Home Exercises and Education Provided     Education provided:   - cont HEP  - Progress towards goals: Good    Written Home Exercises Provided: yes.  Exercises were reviewed and Zoraida was able to demonstrate them prior to the end of the session.  Zoraida demonstrated good  understanding of the education provided.     See EMR under Patient Instructions for exercises provided 9/21/2023.     Zoraida demonstrated good  understanding of the education provided.         Assessment   Zoraida Castro is a 73 y.o. female referred to outpatient occupational therapy and presents with a medical diagnosis of osteoarthritis of finger. Continued activities without difficulty today. Post tx, pt had a loose closed composite fist bilaterally.   Pt would continue to benefit from skilled OT.      Zoraida is progressing well towards her goals and there are no updates to goals at this time. Pt prognosis is Good.     Pt will continue to benefit from skilled  outpatient occupational therapy to address the deficits listed in the problem list on initial evaluation provide pt/family education and to maximize pt's level of independence in the home and community environment.     Anticipated barriers to occupational therapy: none    Pt's spiritual, cultural and educational needs considered and pt agreeable to plan of care and goals.    Goals:  Short Term (4 weeks on 10/18/2023):  1)   Patient to be IND with HEP and modalities for pain management.-Met 10/2/2023  2)   Increase DENSON of both long fingers 10 to 15 degrees to increase functional hand use for gardening.- Met 10/5/2023  3)   Increase  strength 3-5 lbs. to grasp objects.-progressing  4)   Increase pinch 1-3 psis for  opening containers.-progressing   5)   Decrease complaints of pain to  2 out of 10 at worst to increase functional hand use for ADL/work/leisure activities.-progressing     Long Term (by discharge):  1)   Pt will report 1 out of 10 pain with bilateral hand use.-progressing  2)   Patient to score at 67% or more on FOTO to demonstrate improved perception of functional bilateral hand use.- Met 10/2/2023  3)   Pt will return to prior level of function for ADLs and household management.-progressing    Plan:       Plan of Care Certification: 9/18/2023 to 11/18/2023.      Outpatient Occupational Therapy per initial POC. Treatment to include the following interventions: Paraffin, Fluidotherapy, Manual therapy/joint mobilizations, Modalities for pain management, US 3 mhz, Therapeutic exercises/activities., Strengthening, Orthotic Fabrication/Fit/Training, Joint Protection, and Activity Modification.     Discussed Plan of Care with patient: Yes  Updates/Grading for next session: Advance as tolerated.      ARTEMIO Bell, CHT

## 2023-10-19 ENCOUNTER — CLINICAL SUPPORT (OUTPATIENT)
Dept: REHABILITATION | Facility: HOSPITAL | Age: 73
End: 2023-10-19
Payer: MEDICARE

## 2023-10-19 DIAGNOSIS — M25.542 JOINT PAIN IN BOTH HANDS: Primary | ICD-10-CM

## 2023-10-19 DIAGNOSIS — M25.541 JOINT PAIN IN BOTH HANDS: Primary | ICD-10-CM

## 2023-10-19 DIAGNOSIS — M25.60 RANGE OF MOTION DEFICIT: ICD-10-CM

## 2023-10-19 PROCEDURE — 97110 THERAPEUTIC EXERCISES: CPT | Mod: PN

## 2023-10-19 PROCEDURE — 97022 WHIRLPOOL THERAPY: CPT | Mod: PN

## 2023-10-19 NOTE — PROGRESS NOTES
Occupational Therapy Daily Treatment Note     Date: 10/19/2023  Name: Zoraida Castro  Clinic Number: [id number]    Therapy Diagnosis:   Encounter Diagnoses   Name Primary?    Joint pain in both hands Yes    Range of motion deficit      Physician: Mary Turner MD     Physician Orders: Eval and Treat  Medical Diagnosis: M19.049 (ICD-10-CM) - Osteoarthritis of finger, unspecified laterality   Surgical Procedure and Date: N/A  Evaluation Date: 9/18/2023  Insurance Authorization Period Expiration: 08/27/2024   Plan of Care Certification Period: 9/18/2023 to 11/18/2023  Date of Return to MD: none scheduled  Visit # / Visits authorized: 10/20  FOTO: 1/Intake score: 56, 2/Score: 68 3/Score: 70     Precautions:  Standard     Time In: 11:00 am  Time Out: 11:52 pm  Total Appointment Time (timed & untimed codes): 52 minutes    Subjective     Pt reports: she was compliant with home exercise program given last session.   Response to previous treatment:more motion and pain no longer constant  Functional change: decreased pain, less triggering    Pain: 3/10  Location: bilateral hands      Objective      Hand AROM. Measured in degrees.    9/18/2023 9/18/2023 10/5/2023 10/5/2023     Left Right Left Right             Index: DPC touch touch touch touch             Long:  MP 0/63 0/62 0/71 0/73              PIP 8/78 18/74 3/93 13/88              DIP 0/49 0/32 0/63 0/59              DENSON 182 150 224 207             Ring:   DPC touch touch touch touch             Small:  DPC touch touch touch touch             Thumb: DPC of small finger touch touch touch touch         Palpation:   Right/Left     Increased tenderness with palpation over volar MPs of both long fingers      Strength (Dyanmometer) and Pinch Strength (Pinch Gauge)  Measured in pounds and psi. Average of three trials.    9/18/2023 9/18/2023     Left Right   Rung II 19 18   Key Pinch 10 9   3pt Pinch 7 7   2pt Pinch 7 9          supervised  modalities after being cleared for contradictions:      hot pack for 10 minutes to bilateral hands.    Zoraida received manual therapy for 10 minutes after being cleared to contradictions:  -STM to bilateral hands  IASTYM to volar MPs of right and left fingers      Zoraida received therapeutic exercises for 24 minutes  to BL hands including:  - jt blocking long finger PIP/DIP, lumbrical plus, hook, composite fist, finger abd/add and finger ext x 10 reps each   -yellow sponge 2' each grasping (at home)  -tan putty: 2' molding, 2' grasping (at home)  -hand gripper 2 yellow bands x 1'  -yellow digiflex x 1'  -yellow digi-extend x 1'      Zoraida received direct contact modality for 8 minutes after being cleared for contradictions to BL hands including:  Patient received ultrasound to  volar MP area  of right and left long fingers to increase blood flow, circulation, tissue elasticity, pain management and for wound/scar management for 8 minutes (4 minutes each finger) @ 3 Mhz, Intensity .8 w/cm2 at 100* duty cycle.         Home Exercises and Education Provided     Education provided:   - cont HEP  - Progress towards goals: Good    Written Home Exercises Provided: yes.  Exercises were reviewed and Zoraida was able to demonstrate them prior to the end of the session.  Zoraida demonstrated good  understanding of the education provided.     See EMR under Patient Instructions for exercises provided 9/21/2023.     Zoraida demonstrated good  understanding of the education provided.         Assessment   Zoraida Castro is a 73 y.o. female referred to outpatient occupational therapy and presents with a medical diagnosis of osteoarthritis of finger. Continued activities without difficulty today. Post tx, pt had a loose closed composite fist bilaterally.   Pt would continue to benefit from skilled OT.      Zoraida is progressing well towards her goals and there are no updates to goals at this time. Pt prognosis is Good.     Pt will continue to benefit  from skilled outpatient occupational therapy to address the deficits listed in the problem list on initial evaluation provide pt/family education and to maximize pt's level of independence in the home and community environment.     Anticipated barriers to occupational therapy: none    Pt's spiritual, cultural and educational needs considered and pt agreeable to plan of care and goals.    Goals:  Short Term (4 weeks on 10/18/2023):  1)   Patient to be IND with HEP and modalities for pain management.-Met 10/2/2023  2)   Increase DENSON of both long fingers 10 to 15 degrees to increase functional hand use for gardening.- Met 10/5/2023  3)   Increase  strength 3-5 lbs. to grasp objects.-progressing  4)   Increase pinch 1-3 psis for  opening containers.-progressing   5)   Decrease complaints of pain to  2 out of 10 at worst to increase functional hand use for ADL/work/leisure activities.-progressing     Long Term (by discharge):  1)   Pt will report 1 out of 10 pain with bilateral hand use.-progressing  2)   Patient to score at 67% or more on FOTO to demonstrate improved perception of functional bilateral hand use.- Met 10/2/2023  3)   Pt will return to prior level of function for ADLs and household management.-progressing    Plan:       Plan of Care Certification: 9/18/2023 to 11/18/2023.      Outpatient Occupational Therapy per initial POC. Treatment to include the following interventions: Paraffin, Fluidotherapy, Manual therapy/joint mobilizations, Modalities for pain management, US 3 mhz, Therapeutic exercises/activities., Strengthening, Orthotic Fabrication/Fit/Training, Joint Protection, and Activity Modification.     Discussed Plan of Care with patient: Yes  Updates/Grading for next session: Advance as tolerated.      ARTEMIO Bell, CHT

## 2023-10-23 ENCOUNTER — CLINICAL SUPPORT (OUTPATIENT)
Dept: REHABILITATION | Facility: HOSPITAL | Age: 73
End: 2023-10-23
Payer: MEDICARE

## 2023-10-23 DIAGNOSIS — M25.60 RANGE OF MOTION DEFICIT: ICD-10-CM

## 2023-10-23 DIAGNOSIS — M25.542 JOINT PAIN IN BOTH HANDS: Primary | ICD-10-CM

## 2023-10-23 DIAGNOSIS — M25.541 JOINT PAIN IN BOTH HANDS: Primary | ICD-10-CM

## 2023-10-23 PROCEDURE — 97110 THERAPEUTIC EXERCISES: CPT | Mod: PN

## 2023-10-23 PROCEDURE — 97035 APP MDLTY 1+ULTRASOUND EA 15: CPT | Mod: PN

## 2023-10-23 NOTE — PROGRESS NOTES
Occupational Therapy Daily Treatment Note     Date: 10/23/2023  Name: Zoraida Castro  Clinic Number: [id number]    Therapy Diagnosis:   Encounter Diagnoses   Name Primary?    Joint pain in both hands Yes    Range of motion deficit      Physician: Mary Turner MD     Physician Orders: Eval and Treat  Medical Diagnosis: M19.049 (ICD-10-CM) - Osteoarthritis of finger, unspecified laterality   Surgical Procedure and Date: N/A  Evaluation Date: 9/18/2023  Insurance Authorization Period Expiration: 08/27/2024   Plan of Care Certification Period: 9/18/2023 to 11/18/2023  Date of Return to MD: none scheduled  Visit # / Visits authorized: 11/20  FOTO: 1/Intake score: 56, 2/Score: 68 3/Score: 70     Precautions:  Standard     Time In: 11:00 am  Time Out: 11:52 pm  Total Appointment Time (timed & untimed codes): 52 minutes    Subjective     Pt reports: she was compliant with home exercise program given last session.   Response to previous treatment:more motion and pain no longer constant  Functional change: decreased pain, less triggering    Pain: 3/10  Location: bilateral hands      Objective      Hand AROM. Measured in degrees.    9/18/2023 9/18/2023 10/5/2023 10/5/2023     Left Right Left Right             Index: DPC touch touch touch touch             Long:  MP 0/63 0/62 0/71 0/73              PIP 8/78 18/74 3/93 13/88              DIP 0/49 0/32 0/63 0/59              DENSON 182 150 224 207             Ring:   DPC touch touch touch touch             Small:  DPC touch touch touch touch             Thumb: DPC of small finger touch touch touch touch         Palpation:   Right/Left     Increased tenderness with palpation over volar MPs of both long fingers      Strength (Dyanmometer) and Pinch Strength (Pinch Gauge)  Measured in pounds and psi. Average of three trials.    9/18/2023 9/18/2023     Left Right   Rung II 19 18   Key Pinch 10 9   3pt Pinch 7 7   2pt Pinch 7 9          supervised  modalities after being cleared for contradictions:      hot pack for 10 minutes to bilateral hands.    Zoraida received manual therapy for 10 minutes after being cleared to contradictions:  -STM to bilateral hands  IASTYM to volar MPs of right and left fingers      Zoraida received therapeutic exercises for 24 minutes  to BL hands including:  - jt blocking long finger PIP/DIP, lumbrical plus, hook, composite fist, finger abd/add and finger ext x 10 reps each   -yellow sponge 2' each grasping (at home)  -tan putty: 2' molding, 2' grasping (at home)  -hand gripper 3 yellow bands x 1'  -yellow digiflex x 1'  -yellow digi-extend x 1'      Zoraida received direct contact modality for 8 minutes after being cleared for contradictions to BL hands including:  Patient received ultrasound to  volar MP area  of right and left long fingers to increase blood flow, circulation, tissue elasticity, pain management and for wound/scar management for 8 minutes (4 minutes each finger) @ 3 Mhz, Intensity .8 w/cm2 at 100* duty cycle.         Home Exercises and Education Provided     Education provided:   - cont HEP  - Progress towards goals: Good    Written Home Exercises Provided: yes.  Exercises were reviewed and Zoraida was able to demonstrate them prior to the end of the session.  Zoraida demonstrated good  understanding of the education provided.     See EMR under Patient Instructions for exercises provided 9/21/2023.     Zoraida demonstrated good  understanding of the education provided.         Assessment   Zoraida Castro is a 73 y.o. female referred to outpatient occupational therapy and presents with a medical diagnosis of osteoarthritis of finger. Continued activities without difficulty today. Post tx, pt had a loose closed composite fist bilaterally.   Pt would continue to benefit from skilled OT.      Zoraida is progressing well towards her goals and there are no updates to goals at this time. Pt prognosis is Good.     Pt will continue to benefit  from skilled outpatient occupational therapy to address the deficits listed in the problem list on initial evaluation provide pt/family education and to maximize pt's level of independence in the home and community environment.     Anticipated barriers to occupational therapy: none    Pt's spiritual, cultural and educational needs considered and pt agreeable to plan of care and goals.    Goals:  Short Term (4 weeks on 10/18/2023):  1)   Patient to be IND with HEP and modalities for pain management.-Met 10/2/2023  2)   Increase DENSON of both long fingers 10 to 15 degrees to increase functional hand use for gardening.- Met 10/5/2023  3)   Increase  strength 3-5 lbs. to grasp objects.-progressing  4)   Increase pinch 1-3 psis for  opening containers.-progressing   5)   Decrease complaints of pain to  2 out of 10 at worst to increase functional hand use for ADL/work/leisure activities.-progressing     Long Term (by discharge):  1)   Pt will report 1 out of 10 pain with bilateral hand use.-progressing  2)   Patient to score at 67% or more on FOTO to demonstrate improved perception of functional bilateral hand use.- Met 10/2/2023  3)   Pt will return to prior level of function for ADLs and household management.-progressing    Plan: Measure next session.      Plan of Care Certification: 9/18/2023 to 11/18/2023.      Outpatient Occupational Therapy per initial POC. Treatment to include the following interventions: Paraffin, Fluidotherapy, Manual therapy/joint mobilizations, Modalities for pain management, US 3 mhz, Therapeutic exercises/activities., Strengthening, Orthotic Fabrication/Fit/Training, Joint Protection, and Activity Modification.     Discussed Plan of Care with patient: Yes  Updates/Grading for next session: Advance as tolerated.      ARTEMIO Bell, CHT

## 2023-10-26 ENCOUNTER — CLINICAL SUPPORT (OUTPATIENT)
Dept: REHABILITATION | Facility: HOSPITAL | Age: 73
End: 2023-10-26
Payer: MEDICARE

## 2023-10-26 DIAGNOSIS — M25.60 RANGE OF MOTION DEFICIT: ICD-10-CM

## 2023-10-26 DIAGNOSIS — M25.541 JOINT PAIN IN BOTH HANDS: Primary | ICD-10-CM

## 2023-10-26 DIAGNOSIS — M25.542 JOINT PAIN IN BOTH HANDS: Primary | ICD-10-CM

## 2023-10-26 PROCEDURE — 97035 APP MDLTY 1+ULTRASOUND EA 15: CPT | Mod: PN

## 2023-10-26 PROCEDURE — 97140 MANUAL THERAPY 1/> REGIONS: CPT | Mod: PN

## 2023-10-26 PROCEDURE — 97110 THERAPEUTIC EXERCISES: CPT | Mod: PN

## 2023-10-26 NOTE — PATIENT INSTRUCTIONS
[facility]  OCCUPATIONAL THERAPY  HOME EXERCISE PROGRAM     Complete the following strengthening program 1x/day.                                     ARTEMIO Whalen, BELLET  Certified Hand Therapist  Occupational Therapist

## 2023-11-06 ENCOUNTER — CLINICAL SUPPORT (OUTPATIENT)
Dept: REHABILITATION | Facility: HOSPITAL | Age: 73
End: 2023-11-06
Payer: MEDICARE

## 2023-11-06 DIAGNOSIS — M25.541 JOINT PAIN IN BOTH HANDS: Primary | ICD-10-CM

## 2023-11-06 DIAGNOSIS — M25.60 RANGE OF MOTION DEFICIT: ICD-10-CM

## 2023-11-06 DIAGNOSIS — M25.542 JOINT PAIN IN BOTH HANDS: Primary | ICD-10-CM

## 2023-11-06 PROCEDURE — 97035 APP MDLTY 1+ULTRASOUND EA 15: CPT | Mod: PN

## 2023-11-06 PROCEDURE — 97018 PARAFFIN BATH THERAPY: CPT | Mod: PN

## 2023-11-06 PROCEDURE — 97110 THERAPEUTIC EXERCISES: CPT | Mod: PN

## 2023-11-06 NOTE — PROGRESS NOTES
Occupational Therapy Daily Treatment Note     Date: 11/6/2023  Name: Zoraida Castro  Clinic Number: [id number]    Therapy Diagnosis:   Encounter Diagnoses   Name Primary?    Joint pain in both hands Yes    Range of motion deficit      Physician: Mary Turner MD     Physician Orders: Eval and Treat  Medical Diagnosis: M19.049 (ICD-10-CM) - Osteoarthritis of finger, unspecified laterality   Surgical Procedure and Date: N/A  Evaluation Date: 9/18/2023  Insurance Authorization Period Expiration: 08/27/2024   Plan of Care Certification Period: 9/18/2023 to 11/18/2023  Date of Return to MD: none scheduled  Visit # / Visits authorized: 14/20  FOTO: 1/Intake score: 56, 2/Score: 68 3/Score: 70     Precautions:  Standard     Time In: 10:00 am  Time Out:10:51am  Total Appointment Time (timed & untimed codes): 51 minutes    Subjective     Pt reports: she was compliant with home exercise program given last session.   Response to previous treatment:more motion and pain no longer constant  Functional change: decreased pain, less triggering    Pain: 2/10  Location: bilateral hands      Objective      Hand AROM. Measured in degrees.    9/18/2023 9/18/2023 10/5/2023 10/5/2023 10/26/2023 10/26/2023     Left Right Left Right Left Right               Index: DPC touch touch touch touch touch touch               Long:  MP 0/63 0/62 0/71 0/73 0/71 0/73              PIP 8/78 18/74 3/93 13/88 3/103 5/100              DIP 0/49 0/32 0/63 0/59 0/73 0/67              DENSON 182 150 224 207 244 235               Ring:   DPC touch touch touch touch touch touch               Small:  DPC touch touch touch touch touch touch               Thumb: DPC of small finger touch touch touch touch touch touch         Palpation:   Right/Left     Decreased tenderness with palpation over volar MPs of both long fingers      Strength (Dyanmometer) and Pinch Strength (Pinch Gauge)  Measured in pounds and psi. Average of three trials.     9/18/2023 9/18/2023 10/26/2023 10/26/2023     Left Right Left Right   Rung II 19 18 22 24   Heard Pinch 10 9 13 13   3pt Pinch 7 7 8 10   2pt Pinch 7 9 8 9          supervised modalities after being cleared for contradictions:      hot pack for 10 minutes to bilateral hands.    Zoraida received manual therapy for 8 minutes after being cleared to contradictions:  -STM to bilateral hands  IASTYM to volar MPs of right and left fingers      Zoraida received therapeutic exercises for 25 minutes  to BL hands including:  - jt blocking long finger PIP/DIP, lumbrical plus, hook, composite fist, finger abd/add and finger ext x 10 reps each   -yellow sponge 2' each grasping (at home)  -yellow putty: 2' molding, 2' grasping (at home)  -hand gripper 3 yellow bands x 1'  -yellow digiflex x 1'  -yellow digi-extend x 1'      Zoraida received direct contact modality for 8 minutes after being cleared for contradictions to BL hands including:  Patient received ultrasound to  volar MP area  of right and left long fingers to increase blood flow, circulation, tissue elasticity, pain management and for wound/scar management for 8 minutes (4 minutes each finger) @ 3 Mhz, Intensity .8 w/cm2 at 100* duty cycle.         Home Exercises and Education Provided     Education provided:   - cont HEP  - Progress towards goals: Good    Written Home Exercises Provided: yes.  Exercises were reviewed and Zoraida was able to demonstrate them prior to the end of the session.  Zoraida demonstrated good  understanding of the education provided.     See EMR under Patient Instructions for exercises provided 9/21/2023.     Zoraida demonstrated good  understanding of the education provided.         Assessment   Zoraida Castro is a 73 y.o. female referred to outpatient occupational therapy and presents with a medical diagnosis of osteoarthritis of finger. Pt tolerated all activities with no difficulty.      Pt would continue to benefit from skilled OT.      Zoraida is progressing  well towards her goals and there are no updates to goals at this time. Pt prognosis is Good.     Pt will continue to benefit from skilled outpatient occupational therapy to address the deficits listed in the problem list on initial evaluation provide pt/family education and to maximize pt's level of independence in the home and community environment.     Anticipated barriers to occupational therapy: none    Pt's spiritual, cultural and educational needs considered and pt agreeable to plan of care and goals.    Goals:  Short Term (4 weeks on 10/18/2023):  1)   Patient to be IND with HEP and modalities for pain management.-Met 10/2/2023  2)   Increase DENSON of both long fingers 10 to 15 degrees to increase functional hand use for gardening.- Met 10/5/2023  3)   Increase  strength 3-5 lbs. to grasp objects.- Met 10/26/2023  4)   Increase pinch 1-3 psis for  opening containers.- Met 10/26/2023  5)   Decrease complaints of pain to  2 out of 10 at worst to increase functional hand use for ADL/work/leisure activities.- Met 10/26/2023     Long Term (by discharge):  1)   Pt will report 1 out of 10 pain with bilateral hand use.-progressing  2)   Patient to score at 67% or more on FOTO to demonstrate improved perception of functional bilateral hand use.- Met 10/2/2023  3)   Pt will return to prior level of function for ADLs and household management.-progressing    Plan:       Plan of Care Certification: 9/18/2023 to 11/18/2023.      Outpatient Occupational Therapy per initial POC. Treatment to include the following interventions: Paraffin, Fluidotherapy, Manual therapy/joint mobilizations, Modalities for pain management, US 3 mhz, Therapeutic exercises/activities., Strengthening, Orthotic Fabrication/Fit/Training, Joint Protection, and Activity Modification.     Discussed Plan of Care with patient: Yes  Updates/Grading for next session: Advance as tolerated.      ARTEMIO Bell, CHT

## 2023-11-13 ENCOUNTER — IMMUNIZATION (OUTPATIENT)
Dept: FAMILY MEDICINE | Facility: CLINIC | Age: 73
End: 2023-11-13
Payer: MEDICARE

## 2023-11-13 DIAGNOSIS — Z23 NEED FOR VACCINATION: Primary | ICD-10-CM

## 2023-11-13 PROCEDURE — G0008 ADMIN INFLUENZA VIRUS VAC: HCPCS | Mod: PBBFAC,PN

## 2023-11-13 PROCEDURE — 99999PBSHW FLU VACCINE - QUADRIVALENT - ADJUVANTED: Mod: PBBFAC,,,

## 2023-11-13 PROCEDURE — 99999PBSHW FLU VACCINE - QUADRIVALENT - ADJUVANTED: ICD-10-PCS | Mod: PBBFAC,,,

## 2023-11-15 ENCOUNTER — CLINICAL SUPPORT (OUTPATIENT)
Dept: REHABILITATION | Facility: HOSPITAL | Age: 73
End: 2023-11-15
Payer: MEDICARE

## 2023-11-15 DIAGNOSIS — M25.542 JOINT PAIN IN BOTH HANDS: Primary | ICD-10-CM

## 2023-11-15 DIAGNOSIS — M25.541 JOINT PAIN IN BOTH HANDS: Primary | ICD-10-CM

## 2023-11-15 DIAGNOSIS — M25.60 RANGE OF MOTION DEFICIT: ICD-10-CM

## 2023-11-15 PROCEDURE — 97140 MANUAL THERAPY 1/> REGIONS: CPT | Mod: PN

## 2023-11-15 PROCEDURE — 97035 APP MDLTY 1+ULTRASOUND EA 15: CPT | Mod: PN

## 2023-11-15 PROCEDURE — 97110 THERAPEUTIC EXERCISES: CPT | Mod: PN

## 2023-11-15 NOTE — PROGRESS NOTES
Occupational Therapy Daily Treatment Note     Date: 11/15/2023  Name: Zoraida Castro  Clinic Number: [id number]    Therapy Diagnosis:   Encounter Diagnoses   Name Primary?    Joint pain in both hands Yes    Range of motion deficit      Physician: Mary Turner MD     Physician Orders: Eval and Treat  Medical Diagnosis: M19.049 (ICD-10-CM) - Osteoarthritis of finger, unspecified laterality   Surgical Procedure and Date: N/A  Evaluation Date: 9/18/2023  Insurance Authorization Period Expiration: 08/27/2024   Plan of Care Certification Period: 9/18/2023 to 11/18/2023  Date of Return to MD: none scheduled  Visit # / Visits authorized: 15/20  FOTO: 1/Intake score: 56, 2/Score: 68 3/Score: 70     Precautions:  Standard     Time In: 2:00 pm  Time Out:2:56 pm  Total Appointment Time (timed & untimed codes): 56 minutes    Subjective     Pt reports: she was compliant with home exercise program given last session.   Response to previous treatment:more motion and pain no longer constant  Functional change: decreased pain, less triggering    Pain: 2/10  Location: bilateral hands      Objective      Hand AROM. Measured in degrees.    9/18/2023 9/18/2023 10/5/2023 10/5/2023 10/26/2023 10/26/2023     Left Right Left Right Left Right               Index: DPC touch touch touch touch touch touch               Long:  MP 0/63 0/62 0/71 0/73 0/71 0/73              PIP 8/78 18/74 3/93 13/88 3/103 5/100              DIP 0/49 0/32 0/63 0/59 0/73 0/67              DENSON 182 150 224 207 244 235               Ring:   DPC touch touch touch touch touch touch               Small:  DPC touch touch touch touch touch touch               Thumb: DPC of small finger touch touch touch touch touch touch         Palpation:   Right/Left     Decreased tenderness with palpation over volar MPs of both long fingers      Strength (Dyanmometer) and Pinch Strength (Pinch Gauge)  Measured in pounds and psi. Average of three trials.     9/18/2023 9/18/2023 10/26/2023 10/26/2023     Left Right Left Right   Rung II 19 18 22 24   Heard Pinch 10 9 13 13   3pt Pinch 7 7 8 10   2pt Pinch 7 9 8 9          supervised modalities after being cleared for contradictions:      hot pack with paraffin for 10 minutes to bilateral hands.    Zoraida received manual therapy for 8 minutes after being cleared to contradictions:  -STM to bilateral hands  IASTYM to volar MPs of right and left fingers      Zoraida received therapeutic exercises for 30 minutes  to BL hands including:  - jt blocking long finger PIP/DIP, lumbrical plus, hook, composite fist, finger abd/add and finger ext x 10 reps each   -yellow sponge 2' each grasping (at home)  -yellow putty: 2' molding, 2' grasping (at home)  -hand gripper 3 yellow bands x 1'  -yellow digiflex x 1'  -yellow digi-extend x 1'      Zoraida received direct contact modality for 8 minutes after being cleared for contradictions to BL hands including:  Patient received ultrasound to  volar MP area  of right and left long fingers to increase blood flow, circulation, tissue elasticity, pain management and for wound/scar management for 8 minutes (4 minutes each finger) @ 3 Mhz, Intensity .8 w/cm2 at 100* duty cycle.         Home Exercises and Education Provided     Education provided:   - cont HEP  - Progress towards goals: Good    Written Home Exercises Provided: yes.  Exercises were reviewed and Zoraida was able to demonstrate them prior to the end of the session.  Zoraida demonstrated good  understanding of the education provided.     See EMR under Patient Instructions for exercises provided 9/21/2023.     Zoraida demonstrated good  understanding of the education provided.         Assessment   Zoraida Castro is a 73 y.o. female referred to outpatient occupational therapy and presents with a medical diagnosis of osteoarthritis of finger. Pt tolerated all activities with no difficulty. Pt able to make full closed fist with exercises without  triggering.     Pt would continue to benefit from skilled OT.      Zoraida is progressing well towards her goals and there are no updates to goals at this time. Pt prognosis is Good.     Pt will continue to benefit from skilled outpatient occupational therapy to address the deficits listed in the problem list on initial evaluation provide pt/family education and to maximize pt's level of independence in the home and community environment.     Anticipated barriers to occupational therapy: none    Pt's spiritual, cultural and educational needs considered and pt agreeable to plan of care and goals.    Goals:  Short Term (4 weeks on 10/18/2023):  1)   Patient to be IND with HEP and modalities for pain management.-Met 10/2/2023  2)   Increase DENSON of both long fingers 10 to 15 degrees to increase functional hand use for gardening.- Met 10/5/2023  3)   Increase  strength 3-5 lbs. to grasp objects.- Met 10/26/2023  4)   Increase pinch 1-3 psis for  opening containers.- Met 10/26/2023  5)   Decrease complaints of pain to  2 out of 10 at worst to increase functional hand use for ADL/work/leisure activities.- Met 10/26/2023     Long Term (by discharge):  1)   Pt will report 1 out of 10 pain with bilateral hand use.-progressing  2)   Patient to score at 67% or more on FOTO to demonstrate improved perception of functional bilateral hand use.- Met 10/2/2023  3)   Pt will return to prior level of function for ADLs and household management.-progressing    Plan: Prepare for d/c next session.      Plan of Care Certification: 9/18/2023 to 11/18/2023.      Outpatient Occupational Therapy per initial POC. Treatment to include the following interventions: Paraffin, Fluidotherapy, Manual therapy/joint mobilizations, Modalities for pain management, US 3 mhz, Therapeutic exercises/activities., Strengthening, Orthotic Fabrication/Fit/Training, Joint Protection, and Activity Modification.     Discussed Plan of Care with patient:  Yes  Updates/Grading for next session: Advance as tolerated.      ARTEMIO Bell, CHT

## 2023-11-17 ENCOUNTER — CLINICAL SUPPORT (OUTPATIENT)
Dept: REHABILITATION | Facility: HOSPITAL | Age: 73
End: 2023-11-17
Payer: MEDICARE

## 2023-11-17 DIAGNOSIS — M25.541 JOINT PAIN IN BOTH HANDS: Primary | ICD-10-CM

## 2023-11-17 DIAGNOSIS — M25.542 JOINT PAIN IN BOTH HANDS: Primary | ICD-10-CM

## 2023-11-17 DIAGNOSIS — M25.60 RANGE OF MOTION DEFICIT: ICD-10-CM

## 2023-11-17 PROCEDURE — 97110 THERAPEUTIC EXERCISES: CPT | Mod: PN

## 2023-11-17 PROCEDURE — 97035 APP MDLTY 1+ULTRASOUND EA 15: CPT | Mod: PN

## 2023-11-17 PROCEDURE — 97140 MANUAL THERAPY 1/> REGIONS: CPT | Mod: PN

## 2023-11-17 NOTE — PROGRESS NOTES
Occupational Therapy Discharge Note     Date: 11/17/2023  Name: Zoraida Castro  Clinic Number: [id number]    Therapy Diagnosis:   Encounter Diagnoses   Name Primary?    Joint pain in both hands Yes    Range of motion deficit      Physician: Mary Turner MD     Physician Orders: Eval and Treat  Medical Diagnosis: M19.049 (ICD-10-CM) - Osteoarthritis of finger, unspecified laterality   Surgical Procedure and Date: N/A  Evaluation Date: 9/18/2023  Insurance Authorization Period Expiration: 08/27/2024   Plan of Care Certification Period: 9/18/2023 to 11/18/2023  Date of Return to MD: none scheduled  Visit # / Visits authorized: 16/20  FOTO: 1/Intake score: 56, 2/Score: 68 3/Score: 70     Precautions:  Standard     Time In: 1:00 pm  Time Out: 1:56 pm  Total Appointment Time (timed & untimed codes): 56 minutes    Subjective     Pt reports: she was compliant with home exercise program given last session.   Response to previous treatment:more motion and pain no longer constant  Functional change: decreased pain, less triggering    Pain: 1/10  Location: bilateral hands      Objective      Hand AROM. Measured in degrees.    9/18/2023 9/18/2023 10/26/2023 10/26/2023 11/17/2023 11/17/2023     Left Right Left Right Left Right               Index: DPC touch touch touch touch touch touch               Long:  MP 0/63 0/62 0/71 0/73 0/75 0/73              PIP 8/78 18/74 3/103 5/100 5/106 5/100              DIP 0/49 0/32 0/73 0/67 0/73 0/73              DENSON 182 150 244 235 249 244               Ring:   DPC touch touch touch touch touch touch               Small:  DPC touch touch touch touch touch touch               Thumb: DPC of small finger touch touch touch touch touch touch         Palpation:   Right/Left     Decreased tenderness with palpation over volar MPs of both long fingers      Strength (Dyanmometer) and Pinch Strength (Pinch Gauge)  Measured in pounds and psi. Average of three trials.     9/18/2023 9/18/2023 10/26/2023 10/26/2023 11/17/2023 11/17/2023     Left Right Left Right Left Right   Rung II 19 18 22 24 24 29   Heard Pinch 10 9 13 13 13 13   3pt Pinch 7 7 8 10 10 11   2pt Pinch 7 9 8 9 9 10          supervised modalities after being cleared for contradictions:      hot pack with paraffin for 10 minutes to bilateral hands.    Zoraida received manual therapy for 8 minutes after being cleared to contradictions:  -STM to bilateral hands  IASTYM to volar MPs of right and left fingers      Zoraida received therapeutic exercises for 30 minutes  to BL hands including:  - jt blocking long finger PIP/DIP, lumbrical plus, hook, composite fist, finger abd/add and finger ext x 10 reps each   -yellow sponge 2' each grasping (at home)  -yellow putty: 2' molding, 2' grasping (at home)  -hand gripper 3 yellow bands x 1'  -yellow digiflex x 1'  -yellow digi-extend x 1'      Zoraida received direct contact modality for 8 minutes after being cleared for contradictions to BL hands including:  Patient received ultrasound to  volar MP area  of right and left long fingers to increase blood flow, circulation, tissue elasticity, pain management and for wound/scar management for 8 minutes (4 minutes each finger) @ 3 Mhz, Intensity .8 w/cm2 at 100* duty cycle.         Home Exercises and Education Provided     Education provided:   - cont HEP  - Progress towards goals: Good    Written Home Exercises Provided: yes.  Exercises were reviewed and Zoraida was able to demonstrate them prior to the end of the session.  Zoraida demonstrated good  understanding of the education provided.     See EMR under Patient Instructions for exercises provided 9/21/2023.     Zoraida demonstrated good  understanding of the education provided.         Assessment   Zoraida Castro is a 73 y.o. female referred to outpatient occupational therapy and presents with a medical diagnosis of osteoarthritis of finger. Pt's pain has decreased to 1/10 with bilateral hand use.   She has made moderate improvements with AROM of both long fingers and can now make a composite fist without triggering.  Her  and pinch strength have also improved.  Pt tolerated all activities with no difficulty. All goals have been met.    Zoraida is progressing well towards her goals and there are no updates to goals at this time. Pt prognosis is Good.       Anticipated barriers to occupational therapy: none    Pt's spiritual, cultural and educational needs considered and pt agreeable to plan of care and goals.    Goals:  Short Term (4 weeks on 10/18/2023):  1)   Patient to be IND with HEP and modalities for pain management.-Met 10/2/2023  2)   Increase DENSON of both long fingers 10 to 15 degrees to increase functional hand use for gardening.- Met 10/5/2023  3)   Increase  strength 3-5 lbs. to grasp objects.- Met 10/26/2023  4)   Increase pinch 1-3 psis for  opening containers.- Met 10/26/2023  5)   Decrease complaints of pain to  2 out of 10 at worst to increase functional hand use for ADL/work/leisure activities.- Met 10/26/2023     Long Term (by discharge):  1)   Pt will report 1 out of 10 pain with bilateral hand use.- Met 11/17/2023  2)   Patient to score at 67% or more on FOTO to demonstrate improved perception of functional bilateral hand use.- Met 10/2/2023  3)   Pt will return to prior level of function for ADLs and household management.- Met 11/17/2023    Plan: Discharge from OT      Plan of Care Certification: 9/18/2023 to 11/18/2023.      Outpatient Occupational Therapy per initial POC. Treatment to include the following interventions: Paraffin, Fluidotherapy, Manual therapy/joint mobilizations, Modalities for pain management, US 3 mhz, Therapeutic exercises/activities., Strengthening, Orthotic Fabrication/Fit/Training, Joint Protection, and Activity Modification.     Discussed Plan of Care with patient: Yes  Updates/Grading for next session: N/A      ARTEMIO Bell, CHT

## 2024-01-04 DIAGNOSIS — N95.1 MENOPAUSAL AND FEMALE CLIMACTERIC STATES: ICD-10-CM

## 2024-01-04 RX ORDER — MEDROXYPROGESTERONE ACETATE 2.5 MG/1
TABLET ORAL
Qty: 45 TABLET | Refills: 1 | Status: SHIPPED | OUTPATIENT
Start: 2024-01-04

## 2024-01-04 NOTE — TELEPHONE ENCOUNTER
No care due was identified.  [facility] Embedded Care Due Messages. Reference number: [id number].   1/04/2024 8:10:57 AM CST

## 2024-01-04 NOTE — TELEPHONE ENCOUNTER
Refill Routing Note   Medication(s) are not appropriate for processing by [facility] for the following reason(s):        Outside of protocol    [facility] action(s):  Route             Appointments  past 12m or future 3m with PCP    Date Provider   Last Visit   8/28/2023 Mary Turner MD   Next Visit   Visit date not found Mary Turner MD   ED visits in past 90 days: 0        Note composed:11:37 AM 01/04/2024

## 2024-07-11 DIAGNOSIS — I10 HBP (HIGH BLOOD PRESSURE): ICD-10-CM

## 2024-08-20 DIAGNOSIS — N95.1 MENOPAUSAL SYMPTOMS: ICD-10-CM

## 2024-08-20 DIAGNOSIS — N95.1 MENOPAUSAL AND FEMALE CLIMACTERIC STATES: ICD-10-CM

## 2024-08-20 NOTE — TELEPHONE ENCOUNTER
Care Due:                  Date            Visit Type   Department     Provider  --------------------------------------------------------------------------------                                EP -                              PRIMARY      [facility]  Last Visit: 08-      CARE ([facility])   MEDICINE       Mary Turner                              MYCHART                              FOLLOWUP/OF  [facility]  Next Visit: 08-      FICE VISIT   MEDICINE       Mary Turner                                                            Last  Test          Frequency    Reason                     Performed    Due Date  --------------------------------------------------------------------------------    CMP.........  12 months..  ezetimibe,                 07- 06-                             lisinopriL-hydrochlorothi                             azide....................    Lipid Panel.  12 months..  ezetimibe................  07- 06-    Health [facility] Embedded Care Due Messages. Reference number: [id number].   8/20/2024 8:27:22 AM CDT

## 2024-08-22 ENCOUNTER — HOSPITAL ENCOUNTER (OUTPATIENT)
Dept: RADIOLOGY | Facility: HOSPITAL | Age: 74
Discharge: HOME | End: 2024-08-22
Attending: FAMILY MEDICINE
Payer: MEDICARE

## 2024-08-22 DIAGNOSIS — Z12.31 OTHER SCREENING MAMMOGRAM: ICD-10-CM

## 2024-08-22 PROCEDURE — 77067 SCR MAMMO BI INCL CAD: CPT | Mod: TC,PO

## 2024-08-22 PROCEDURE — 77067 SCR MAMMO BI INCL CAD: CPT | Mod: 26,,, | Performed by: RADIOLOGY

## 2024-08-22 PROCEDURE — 77063 BREAST TOMOSYNTHESIS BI: CPT | Mod: 26,,, | Performed by: RADIOLOGY

## 2024-08-22 RX ORDER — ESTRADIOL 0.5 MG/1
TABLET ORAL
Qty: 45 TABLET | Refills: 0 | Status: SHIPPED | OUTPATIENT
Start: 2024-08-22

## 2024-08-22 RX ORDER — MEDROXYPROGESTERONE ACETATE 2.5 MG/1
TABLET ORAL
Qty: 45 TABLET | Refills: 0 | Status: SHIPPED | OUTPATIENT
Start: 2024-08-22

## 2024-08-22 NOTE — TELEPHONE ENCOUNTER
Provider Staff:  Action required for this patient    Requires labs      Please see care gap opportunities below in Care Due Message.    Thanks!  [facility]     Appointments      Date Provider   Last Visit   8/28/2023 Mary Turner MD   Next Visit   8/30/2024 Mary Turner MD     Refill Decision Note   Zoraida Castro  is requesting a refill authorization.  Brief Assessment and Rationale for Refill:  Approve     Medication Therapy Plan:  FOVS      Comments:     Note composed:6:30 AM 08/22/2024

## 2024-08-27 ENCOUNTER — LAB VISIT (OUTPATIENT)
Dept: LAB | Facility: HOSPITAL | Age: 74
End: 2024-08-27
Attending: FAMILY MEDICINE
Payer: MEDICARE

## 2024-08-27 DIAGNOSIS — I10 HBP (HIGH BLOOD PRESSURE): ICD-10-CM

## 2024-08-27 LAB
ALBUMIN SERPL BCP-MCNC: 3.8 G/DL (ref 3.5–5.2)
ALP SERPL-CCNC: 57 U/L (ref 55–135)
ALT SERPL W/O P-5'-P-CCNC: 16 U/L (ref 10–44)
ANION GAP SERPL CALC-SCNC: 8 MMOL/L (ref 8–16)
AST SERPL-CCNC: 24 U/L (ref 10–40)
BILIRUB SERPL-MCNC: 0.6 MG/DL (ref 0.1–1)
BUN SERPL-MCNC: 11 MG/DL (ref 8–23)
CALCIUM SERPL-MCNC: 9.1 MG/DL (ref 8.7–10.5)
CHLORIDE SERPL-SCNC: 101 MMOL/L (ref 95–110)
CO2 SERPL-SCNC: 26 MMOL/L (ref 23–29)
CREAT SERPL-MCNC: 0.8 MG/DL (ref 0.5–1.4)
EST. GFR  (NO RACE VARIABLE): >60 ML/MIN/1.73 M^2
GLUCOSE SERPL-MCNC: 93 MG/DL (ref 70–110)
POTASSIUM SERPL-SCNC: 3.7 MMOL/L (ref 3.5–5.1)
PROT SERPL-MCNC: 6.7 G/DL (ref 6–8.4)
SODIUM SERPL-SCNC: 135 MMOL/L (ref 136–145)

## 2024-08-27 PROCEDURE — 36415 COLL VENOUS BLD VENIPUNCTURE: CPT | Mod: PN | Performed by: FAMILY MEDICINE

## 2024-08-27 PROCEDURE — 80053 COMPREHEN METABOLIC PANEL: CPT | Performed by: FAMILY MEDICINE

## 2024-08-30 ENCOUNTER — OFFICE VISIT (OUTPATIENT)
Dept: FAMILY MEDICINE | Facility: CLINIC | Age: 74
End: 2024-08-30
Payer: MEDICARE

## 2024-08-30 VITALS
DIASTOLIC BLOOD PRESSURE: 70 MMHG | BODY MASS INDEX: 24.53 KG/M2 | SYSTOLIC BLOOD PRESSURE: 136 MMHG | HEART RATE: 85 BPM | HEIGHT: 63 IN | OXYGEN SATURATION: 98 % | WEIGHT: 138.44 LBS

## 2024-08-30 DIAGNOSIS — F32.5 MAJOR DEPRESSIVE DISORDER WITH SINGLE EPISODE, IN FULL REMISSION: ICD-10-CM

## 2024-08-30 DIAGNOSIS — E78.5 HYPERLIPIDEMIA, UNSPECIFIED HYPERLIPIDEMIA TYPE: Primary | ICD-10-CM

## 2024-08-30 DIAGNOSIS — I10 HYPERTENSION, UNSPECIFIED TYPE: ICD-10-CM

## 2024-08-30 DIAGNOSIS — Z12.11 SPECIAL SCREENING FOR MALIGNANT NEOPLASMS, COLON: ICD-10-CM

## 2024-08-30 PROCEDURE — 99999 PR PBB SHADOW E&M-EST. PATIENT-LVL III: CPT | Mod: PBBFAC,,, | Performed by: FAMILY MEDICINE

## 2024-08-30 PROCEDURE — 99213 OFFICE O/P EST LOW 20 MIN: CPT | Mod: PBBFAC,PN | Performed by: FAMILY MEDICINE

## 2024-08-30 RX ORDER — EZETIMIBE 10 MG/1
10 TABLET ORAL DAILY
Qty: 90 TABLET | Refills: 3 | Status: SHIPPED | OUTPATIENT
Start: 2024-08-30

## 2024-08-30 RX ORDER — LISINOPRIL AND HYDROCHLOROTHIAZIDE 12.5; 2 MG/1; MG/1
1 TABLET ORAL EVERY MORNING
Qty: 90 TABLET | Refills: 3 | Status: SHIPPED | OUTPATIENT
Start: 2024-08-30

## 2024-08-30 NOTE — PROGRESS NOTES
Subjective:       Patient ID: Zoraida Castro is a 74 y.o. female.    Chief Complaint: Annual Exam      Zoraida Castro is in the office for annual exam.    HPI  Medical hx reviewed.   Past Medical History:   Diagnosis Date    Allergy     HBP (high blood pressure)     Headache(784.0)     Hearing loss     Menopausal symptoms     PONV (postoperative nausea and vomiting)      She did have an episode of dehydration after spending too much time in the yard with the heat. Improved with pedialyte.    No worries re fatigue, gooden, sob with any activities.   Hand arthritis and rom significantly improved with PT exercises.   Feels that she's eating healthier.     Spot of something on her buttocks that she wants to get checked out.   Varicose veins - has had them treated in the past (vasc/fernando), but seeing an increase in size in the R thigh again. Not symptomatic.   Memory test for a care policy. She had issues with some word recall but otherwise ok.       Current Outpatient Medications:     aspirin (ECOTRIN) 81 MG EC tablet, Take 81 mg by mouth every other day., Disp: , Rfl:     calcium carb&cit-mag12-vit D3 (CALCIUM MAGNESIUM + D) 500-250-200 mg-mg-unit Tab, Take by mouth. 1.5 Tablet Oral Twice a day , Disp: , Rfl:     estradioL (ESTRACE) 0.5 MG tablet, TAKE 1/2 TABLET BY MOUTH ONCE DAILY, Disp: 45 tablet, Rfl: 0    medroxyPROGESTERone (PROVERA) 2.5 MG tablet, TAKE 1/2 TABLET BY MOUTH ONCE DAILY, Disp: 45 tablet, Rfl: 0    multivitamin with minerals tablet, Take 1 tablet by mouth once daily., Disp: , Rfl:     ezetimibe (ZETIA) 10 mg tablet, Take 1 tablet (10 mg total) by mouth once daily., Disp: 90 tablet, Rfl: 3    lisinopriL-hydrochlorothiazide (PRINZIDE,ZESTORETIC) 20-12.5 mg per tablet, Take 1 tablet by mouth every morning., Disp: 90 tablet, Rfl: 3    The 10-year ASCVD risk score (Harvey WHITMAN, et al., 2019) is: 23.9%    Values used to calculate the score:      Age: 74 years      Sex: Female      Is Non-   American: No      Diabetic: No      Tobacco smoker: No      Systolic Blood Pressure: 136 mmHg      Is BP treated: Yes      HDL Cholesterol: 76 mg/dL      Total Cholesterol: 239 mg/dL     Lab Results   Component Value Date    HGBA1C 5.2 07/03/2023    HGBA1C 5.2 06/13/2022     Lab Results   Component Value Date    LDLCALC 141.6 07/03/2023    CREATININE 0.8 08/27/2024   Labs 2024 rev.     Review of Systems   Constitutional:  Negative for activity change, fatigue (energy levels and exertion are improved outside of summer) and unexpected weight change.   HENT:  Negative for hearing loss, rhinorrhea and trouble swallowing.    Eyes:  Negative for photophobia, discharge and visual disturbance.   Respiratory:  Negative for cough, chest tightness, shortness of breath and wheezing.    Cardiovascular:  Negative for chest pain, palpitations and leg swelling.   Gastrointestinal:  Negative for abdominal pain, blood in stool, constipation, diarrhea and vomiting.        No gerd c/o, avoiding trigger foods   Endocrine: Negative for polydipsia and polyuria.   Genitourinary:  Negative for difficulty urinating, dysuria, frequency (nighttime x 2), hematuria and menstrual problem.   Musculoskeletal:  Negative for arthralgias, joint swelling and neck pain.        Walking for exercise - in her house during the summer for about a mile   Skin:  Negative for rash and wound.   Neurological:  Negative for dizziness, weakness, light-headedness and headaches.   Psychiatric/Behavioral:  Negative for confusion, dysphoric mood and sleep disturbance.            Objective:      Physical Exam  Vitals and nursing note reviewed.   Constitutional:       General: She is not in acute distress.     Appearance: Normal appearance. She is well-developed.   HENT:      Head: Normocephalic and atraumatic.      Right Ear: Tympanic membrane and external ear normal.      Left Ear: Tympanic membrane and external ear normal.      Nose: Nose normal.      Mouth/Throat:       Pharynx: No oropharyngeal exudate.   Eyes:      Conjunctiva/sclera: Conjunctivae normal.      Pupils: Pupils are equal, round, and reactive to light.   Neck:      Thyroid: No thyromegaly.   Cardiovascular:      Rate and Rhythm: Normal rate and regular rhythm.   Pulmonary:      Effort: Pulmonary effort is normal. No respiratory distress.      Breath sounds: Normal breath sounds. No wheezing.   Abdominal:      General: Bowel sounds are normal. There is no distension.      Palpations: Abdomen is soft. There is no mass.      Tenderness: There is no abdominal tenderness. There is no guarding or rebound.   Musculoskeletal:      Cervical back: Neck supple.      Right lower leg: No edema.      Left lower leg: No edema.   Lymphadenopathy:      Cervical: No cervical adenopathy.   Skin:     General: Skin is warm and dry.   Neurological:      General: No focal deficit present.      Mental Status: She is alert and oriented to person, place, and time.      Cranial Nerves: No cranial nerve deficit.   Psychiatric:         Mood and Affect: Mood normal.         Behavior: Behavior normal.             Screening recommendations appropriate to age and health status were reviewed.    Assessment & Plan:    Hyperlipidemia, unspecified hyperlipidemia type  Comments:  improved on current regimen  Orders:  -     CBC Without Differential; Future; Expected date: 08/30/2024  -     Comprehensive Metabolic Panel; Future; Expected date: 08/30/2024  -     Lipid Panel; Future; Expected date: 08/30/2024  -     TSH; Future; Expected date: 08/30/2024  -     ezetimibe (ZETIA) 10 mg tablet; Take 1 tablet (10 mg total) by mouth once daily.  Dispense: 90 tablet; Refill: 3    Special screening for malignant neoplasms, colon  -     Fecal Immunochemical Test (iFOBT); Future; Expected date: 08/30/2024    Major depressive disorder with single episode, in full remission  Comments:  well-controlled, doing well    Hypertension, unspecified  type  Comments:  controlled, cont regimen  Orders:  -     lisinopriL-hydrochlorothiazide (PRINZIDE,ZESTORETIC) 20-12.5 mg per tablet; Take 1 tablet by mouth every morning.  Dispense: 90 tablet; Refill: 3

## 2024-09-09 ENCOUNTER — LAB VISIT (OUTPATIENT)
Dept: LAB | Facility: HOSPITAL | Age: 74
End: 2024-09-09
Attending: FAMILY MEDICINE
Payer: MEDICARE

## 2024-09-09 DIAGNOSIS — Z12.11 SPECIAL SCREENING FOR MALIGNANT NEOPLASMS, COLON: ICD-10-CM

## 2024-09-09 PROCEDURE — 82274 ASSAY TEST FOR BLOOD FECAL: CPT | Performed by: FAMILY MEDICINE

## 2024-09-10 LAB — HEMOCCULT STL QL IA: NEGATIVE

## 2024-10-02 ENCOUNTER — PATIENT MESSAGE (OUTPATIENT)
Dept: FAMILY MEDICINE | Facility: CLINIC | Age: 74
End: 2024-10-02
Payer: MEDICARE

## 2025-02-22 DIAGNOSIS — Z00.00 ENCOUNTER FOR MEDICARE ANNUAL WELLNESS EXAM: ICD-10-CM

## 2025-03-17 DIAGNOSIS — N95.1 MENOPAUSAL SYMPTOMS: ICD-10-CM

## 2025-03-17 RX ORDER — ESTRADIOL 0.5 MG/1
0.25 TABLET ORAL
Qty: 45 TABLET | Refills: 1 | Status: SHIPPED | OUTPATIENT
Start: 2025-03-17

## 2025-03-17 NOTE — TELEPHONE ENCOUNTER
Care Due:                  Date            Visit Type   Department     Provider  --------------------------------------------------------------------------------                                MYCHART                              FOLLOWUP/OF  [facility] FAM[facility]  Last Visit: 08-      FICE VISIT   MEDICINE       Mary Turner  Next Visit: None Scheduled  None         None Found                                                            Last  Test          Frequency    Reason                     Performed    Due Date  --------------------------------------------------------------------------------    Lipid Panel.  12 months..  ezetimibe................  07- 06-    [facility] Embedded Care Due Messages. Reference number: [id number].   3/17/2025 8:36:08 AM CDT

## 2025-03-18 DIAGNOSIS — N95.1 MENOPAUSAL AND FEMALE CLIMACTERIC STATES: ICD-10-CM

## 2025-03-18 RX ORDER — MEDROXYPROGESTERONE ACETATE 2.5 MG/1
1.25 TABLET ORAL
Qty: 45 TABLET | Refills: 1 | Status: SHIPPED | OUTPATIENT
Start: 2025-03-18

## 2025-03-18 NOTE — TELEPHONE ENCOUNTER
No care due was identified.  Health [facility] Embedded Care Due Messages. Reference number: [id number].   3/18/2025 8:02:27 AM CDT

## 2025-03-18 NOTE — TELEPHONE ENCOUNTER
Refill Decision Note   Zoraida Matthew  is requesting a refill authorization.  Brief Assessment and Rationale for Refill:  Approve     Medication Therapy Plan:         Comments:     Note composed:11:52 AM 03/18/2025

## 2025-03-20 ENCOUNTER — OFFICE VISIT (OUTPATIENT)
Dept: FAMILY MEDICINE | Facility: CLINIC | Age: 75
End: 2025-03-20
Payer: MEDICARE

## 2025-03-20 VITALS
HEIGHT: 63 IN | DIASTOLIC BLOOD PRESSURE: 72 MMHG | OXYGEN SATURATION: 99 % | BODY MASS INDEX: 25.41 KG/M2 | HEART RATE: 99 BPM | SYSTOLIC BLOOD PRESSURE: 136 MMHG | WEIGHT: 143.44 LBS

## 2025-03-20 DIAGNOSIS — E78.5 HYPERLIPIDEMIA, UNSPECIFIED HYPERLIPIDEMIA TYPE: ICD-10-CM

## 2025-03-20 DIAGNOSIS — I10 HYPERTENSION, UNSPECIFIED TYPE: ICD-10-CM

## 2025-03-20 PROCEDURE — 99999 PR PBB SHADOW E&M-EST. PATIENT-LVL III: CPT | Mod: PBBFAC,,, | Performed by: NURSE PRACTITIONER

## 2025-03-20 PROCEDURE — 99214 OFFICE O/P EST MOD 30 MIN: CPT | Mod: S$PBB,,, | Performed by: NURSE PRACTITIONER

## 2025-03-20 PROCEDURE — 99213 OFFICE O/P EST LOW 20 MIN: CPT | Mod: PBBFAC,PN | Performed by: NURSE PRACTITIONER

## 2025-03-20 RX ORDER — LISINOPRIL AND HYDROCHLOROTHIAZIDE 12.5; 2 MG/1; MG/1
1 TABLET ORAL EVERY MORNING
Qty: 90 TABLET | Refills: 3 | Status: SHIPPED | OUTPATIENT
Start: 2025-03-20

## 2025-03-20 RX ORDER — EZETIMIBE 10 MG/1
10 TABLET ORAL DAILY
Qty: 90 TABLET | Refills: 3 | Status: SHIPPED | OUTPATIENT
Start: 2025-03-20

## 2025-03-20 NOTE — PROGRESS NOTES
THIS DOCUMENT WAS MADE IN PART WITH VOICE RECOGNITION SOFTWARE.  OCCASIONALLY THIS SOFTWARE WILL MISINTERPRET WORDS OR PHRASES.     Assessment and Plan:    Hypertension, unspecified type  Comments:  controlled, cont regimen  Orders:  -     lisinopriL-hydrochlorothiazide (PRINZIDE,ZESTORETIC) 20-12.5 mg per tablet; Take 1 tablet by mouth every morning.  Dispense: 90 tablet; Refill: 3    Hyperlipidemia, unspecified hyperlipidemia type  Comments:  improved on current regimen  Due to repeat lipids  Orders:  -     ezetimibe (ZETIA) 10 mg tablet; Take 1 tablet (10 mg total) by mouth once daily.  Dispense: 90 tablet; Refill: 3             Visit summary:    Chronic conditions stable    Medications refilled.    Patient will be having labs completed- previously ordered by PCP.    Continue to work on regular exercise, maintain healthy weight, balanced diet. Avoid unhealthy habits: smoking, excessive alcohol intake.      Follow up in about 6 months (around 9/20/2025) for Annual with PCP-Dr. Turner.   ______________________________________________________________________  Subjective:    Chief Complaint:  Six-month Follow up chronic medical conditions.    HPI:  Zoraida is a 75 y.o. year old female with a past medical history noted below, here to follow up chronic medical conditions/medication refills.       HTN: controlled- taking lisinopril-HCTZ 20-12.5 mg daily as prescribed    Hyperlipidemia:  Due to repeat lipid panel,  on previous.  Taking Zetia 10 mg, tolerating well.        Medications:  Medications Ordered Prior to Encounter[1]    Review of Systems:  Review of Systems   Constitutional:  Negative for fatigue and unexpected weight change.   HENT:  Negative for congestion, postnasal drip and rhinorrhea.    Respiratory:  Negative for cough and shortness of breath.    Gastrointestinal:  Negative for constipation, diarrhea, nausea and vomiting.   Genitourinary:  Negative for difficulty urinating and dysuria.  "  Musculoskeletal:  Negative for arthralgias and back pain.   Neurological:  Negative for dizziness and headaches.       Past Medical History:  Past Medical History:   Diagnosis Date    Allergy     HBP (high blood pressure)     Headache(784.0)     Hearing loss     Menopausal symptoms     PONV (postoperative nausea and vomiting)        Objective:    Vitals:  Vitals:    03/20/25 0902   BP: 136/72   Pulse: 99   SpO2: 99%   Weight: 65.1 kg (143 lb 6.6 oz)   Height: 5' 3" (1.6 m)   PainSc: 0-No pain       Physical Exam  Vitals and nursing note reviewed.   Constitutional:       General: She is not in acute distress.  HENT:      Head: Normocephalic and atraumatic.   Eyes:      General: No scleral icterus.     Conjunctiva/sclera: Conjunctivae normal.   Cardiovascular:      Rate and Rhythm: Normal rate and regular rhythm.   Pulmonary:      Effort: Pulmonary effort is normal. No respiratory distress.      Breath sounds: Normal breath sounds.   Musculoskeletal:      Right lower leg: No edema.      Left lower leg: No edema.   Skin:     General: Skin is warm and dry.   Neurological:      Mental Status: She is alert and oriented to person, place, and time.   Psychiatric:         Mood and Affect: Mood normal.         Behavior: Behavior normal.         Thought Content: Thought content normal.         Data:  The 10-year ASCVD risk score (Harvey DK, et al., 2019) is: 26.5%    Values used to calculate the score:      Age: 75 years      Sex: Female      Is Non- : No      Diabetic: No      Tobacco smoker: No      Systolic Blood Pressure: 136 mmHg      Is BP treated: Yes      HDL Cholesterol: 76 mg/dL      Total Cholesterol: 239 mg/dL     CMP  Sodium   Date Value Ref Range Status   08/27/2024 135 (L) 136 - 145 mmol/L Final     Potassium   Date Value Ref Range Status   08/27/2024 3.7 3.5 - 5.1 mmol/L Final     Chloride   Date Value Ref Range Status   08/27/2024 101 95 - 110 mmol/L Final     CO2   Date Value Ref " Range Status   08/27/2024 26 23 - 29 mmol/L Final     Glucose   Date Value Ref Range Status   08/27/2024 93 70 - 110 mg/dL Final     BUN   Date Value Ref Range Status   08/27/2024 11 8 - 23 mg/dL Final     Creatinine   Date Value Ref Range Status   08/27/2024 0.8 0.5 - 1.4 mg/dL Final     Calcium   Date Value Ref Range Status   08/27/2024 9.1 8.7 - 10.5 mg/dL Final     Total Protein   Date Value Ref Range Status   08/27/2024 6.7 6.0 - 8.4 g/dL Final     Albumin   Date Value Ref Range Status   08/27/2024 3.8 3.5 - 5.2 g/dL Final     Total Bilirubin   Date Value Ref Range Status   08/27/2024 0.6 0.1 - 1.0 mg/dL Final     Comment:     For infants and newborns, interpretation of results should be based  on gestational age, weight and in agreement with clinical  observations.    Premature Infant recommended reference ranges:  Up to 24 hours.............<8.0 mg/dL  Up to 48 hours............<12.0 mg/dL  3-5 days..................<15.0 mg/dL  6-29 days.................<15.0 mg/dL       Alkaline Phosphatase   Date Value Ref Range Status   08/27/2024 57 55 - 135 U/L Final     AST   Date Value Ref Range Status   08/27/2024 24 10 - 40 U/L Final     ALT   Date Value Ref Range Status   08/27/2024 16 10 - 44 U/L Final     Anion Gap   Date Value Ref Range Status   08/27/2024 8 8 - 16 mmol/L Final     eGFR   Date Value Ref Range Status   08/27/2024 >60.0 >60 mL/min/1.73 m^2 Final         Medical history reviewed, Medications reconciled.              RAMON Kent-C  Family Medicine         [1]   Current Outpatient Medications on File Prior to Visit   Medication Sig Dispense Refill    aspirin (ECOTRIN) 81 MG EC tablet Take 81 mg by mouth every other day.      calcium carb&cit-mag12-vit D3 (CALCIUM MAGNESIUM + D) 500-250-200 mg-mg-unit Tab Take by mouth. 1.5 Tablet Oral Twice a day       estradioL (ESTRACE) 0.5 MG tablet TAKE 1/2 TABLET BY MOUTH ONCE DAILY 45 tablet 1    medroxyPROGESTERone (PROVERA) 2.5 MG tablet TAKE 1/2  TABLET BY MOUTH ONCE DAILY 45 tablet 1    multivitamin with minerals tablet Take 1 tablet by mouth once daily.      [DISCONTINUED] ezetimibe (ZETIA) 10 mg tablet Take 1 tablet (10 mg total) by mouth once daily. 90 tablet 3    [DISCONTINUED] lisinopriL-hydrochlorothiazide (PRINZIDE,ZESTORETIC) 20-12.5 mg per tablet Take 1 tablet by mouth every morning. 90 tablet 3     No current facility-administered medications on file prior to visit.

## 2025-03-22 ENCOUNTER — PATIENT MESSAGE (OUTPATIENT)
Dept: FAMILY MEDICINE | Facility: CLINIC | Age: 75
End: 2025-03-22
Payer: MEDICARE

## 2025-08-07 DIAGNOSIS — N95.1 MENOPAUSAL AND FEMALE CLIMACTERIC STATES: ICD-10-CM

## 2025-08-07 DIAGNOSIS — N95.1 MENOPAUSAL SYMPTOMS: ICD-10-CM

## 2025-08-07 RX ORDER — MEDROXYPROGESTERONE ACETATE 2.5 MG/1
1.25 TABLET ORAL
Qty: 45 TABLET | Refills: 0 | Status: SHIPPED | OUTPATIENT
Start: 2025-08-07

## 2025-08-07 RX ORDER — ESTRADIOL 0.5 MG/1
0.25 TABLET ORAL
Qty: 45 TABLET | Refills: 0 | Status: SHIPPED | OUTPATIENT
Start: 2025-08-07

## 2025-08-07 NOTE — TELEPHONE ENCOUNTER
Refill Decision Note   Zoraida Matthew  is requesting a refill authorization.  Brief Assessment and Rationale for Refill:  Approve     Medication Therapy Plan:         Comments:     Note composed:12:41 PM 08/07/2025

## (undated) DEVICE — SEE L#120831

## (undated) DEVICE — DRAPE PLASTIC U 60X72

## (undated) DEVICE — SEE MEDLINE ITEM 157128

## (undated) DEVICE — GLOVE PROTEXIS LTX MICRO 8

## (undated) DEVICE — SYR 10CC LUER LOCK

## (undated) DEVICE — APPLICATOR CHLORAPREP CLR 10.5

## (undated) DEVICE — SEE MEDLINE ITEM 146313

## (undated) DEVICE — SUT 3-0 VICRYL / SH (J416)

## (undated) DEVICE — DRAPE STERI-DRAPE 1000 17X11IN

## (undated) DEVICE — ALCOHOL 70% ISOP RUBBING 4OZ

## (undated) DEVICE — DRESSING XEROFORM FOIL PK 1X8

## (undated) DEVICE — SEE MEDLINE ITEM 157173

## (undated) DEVICE — Device

## (undated) DEVICE — SEE MEDLINE ITEM 152514

## (undated) DEVICE — SEE MEDLINE ITEM 152622

## (undated) DEVICE — GLOVE PROTEXIS LTX MICRO  7.5

## (undated) DEVICE — BANDAGE ESMARK LATEX FREE 4INX

## (undated) DEVICE — NDL 27G X 1 1/4

## (undated) DEVICE — PAD CAST SPECIALIST STRL 3

## (undated) DEVICE — GAUZE SPONGE 4X4 12PLY

## (undated) DEVICE — TOURNIQUET SB QC DP 18X4IN

## (undated) DEVICE — APPLICATOR CHLORAPREP ORN 26ML

## (undated) DEVICE — SUT PROLENE 4-0 MONO 18IN

## (undated) DEVICE — SEE MEDLINE ITEM 157131

## (undated) DEVICE — SUT ETHILON 4-0 PS2 18 BLK

## (undated) DEVICE — CORD BIPOLAR 12 FOOT

## (undated) DEVICE — FORCEP STRAIGHT DISP

## (undated) DEVICE — SEE MEDLINE ITEM 152487